# Patient Record
Sex: FEMALE | Race: ASIAN | Employment: PART TIME | URBAN - METROPOLITAN AREA
[De-identification: names, ages, dates, MRNs, and addresses within clinical notes are randomized per-mention and may not be internally consistent; named-entity substitution may affect disease eponyms.]

---

## 2017-07-08 ENCOUNTER — OFFICE VISIT (OUTPATIENT)
Dept: FAMILY MEDICINE | Facility: CLINIC | Age: 22
End: 2017-07-08
Payer: COMMERCIAL

## 2017-07-08 VITALS
WEIGHT: 122.6 LBS | RESPIRATION RATE: 12 BRPM | BODY MASS INDEX: 24.07 KG/M2 | HEART RATE: 68 BPM | DIASTOLIC BLOOD PRESSURE: 72 MMHG | HEIGHT: 60 IN | TEMPERATURE: 98 F | OXYGEN SATURATION: 100 % | SYSTOLIC BLOOD PRESSURE: 111 MMHG

## 2017-07-08 DIAGNOSIS — H10.13 ALLERGIC CONJUNCTIVITIS, BILATERAL: Primary | ICD-10-CM

## 2017-07-08 PROCEDURE — 99213 OFFICE O/P EST LOW 20 MIN: CPT | Performed by: FAMILY MEDICINE

## 2017-07-08 RX ORDER — OLOPATADINE HYDROCHLORIDE 1 MG/ML
1 SOLUTION/ DROPS OPHTHALMIC 2 TIMES DAILY
Qty: 5 ML | Refills: 3 | Status: ON HOLD | OUTPATIENT
Start: 2017-07-08 | End: 2019-10-13

## 2017-07-08 NOTE — PROGRESS NOTES
SUBJECTIVE:                                                    Maryjane Wang is a 21 year old female who presents to clinic today for the following health issues:    Concern - eye problem   Onset: a week     Description:   itching     Intensity: moderate    Progression of Symptoms:  Improving a little when used eye drops     Accompanying Signs & Symptoms:  None     Previous history of similar problem:   None     Precipitating factors:   Worsened by: none     Alleviating factors:  Improved by: eye drop helps a little     Therapies Tried and outcome:     Problem list and histories reviewed & adjusted, as indicated.  Additional history: as documented    Reviewed and updated as needed this visit by clinical staff  Tobacco  Allergies  Med Hx  Surg Hx  Fam Hx  Soc Hx      Reviewed and updated as needed this visit by Provider         ROS:  EYES: NEGATIVE for vision changes    OBJECTIVE:                                                    /72 (BP Location: Right arm, Patient Position: Chair, Cuff Size: Adult Regular)  Pulse 68  Temp 98  F (36.7  C) (Oral)  Resp 12  Ht 5' (1.524 m)  Wt 122 lb 9.6 oz (55.6 kg)  SpO2 100%  BMI 23.94 kg/m2  Body mass index is 23.94 kg/(m^2).  GENERAL APPEARANCE: healthy, alert and no distress  EYES: mild chemosis medial left eye, minimal conjunctival injection  HENT: ear canals and TM's normal, oral mucous membranes moist and oropharynx clear     ASSESSMENT/PLAN:                                                    1. Allergic conjunctivitis, bilateral  Discussed zyrtec, flonase, antihistamine eye drops.  Follow-up if not improving.  - olopatadine (PATANOL) 0.1 % ophthalmic solution; Place 1 drop into both eyes 2 times daily  Dispense: 5 mL; Refill: 3    Kaden Contreras MD  [facility]

## 2017-07-08 NOTE — MR AVS SNAPSHOT
After Visit Summary   7/8/2017    Maryjane Wang    MRN: [id number]           Patient Information     Date Of Birth          1995        Visit Information        Provider Department      7/8/2017 9:00 AM Kaden Contreras MD [facility]        Today's Diagnoses     Allergic conjunctivitis, bilateral    -  1      Care Instructions    Flonase - daily nasal spray    Zyrtec orally          Follow-ups after your visit        Who to contact     If you have questions or need follow up information about today's clinic visit or your schedule please contact [facility] directly at [phone].  Normal or non-critical lab and imaging results will be communicated to you by MyChart, letter or phone within 4 business days after the clinic has received the results. If you do not hear from us within 7 days, please contact the clinic through Plaidhart or phone. If you have a critical or abnormal lab result, we will notify you by phone as soon as possible.  Submit refill requests through [employer] or call your pharmacy and they will forward the refill request to us. Please allow 3 business days for your refill to be completed.          Additional Information About Your Visit        MyChart Information     [employer] gives you secure access to your electronic health record. If you see a primary care provider, you can also send messages to your care team and make appointments. If you have questions, please call your primary care clinic.  If you do not have a primary care provider, please call [phone] and they will assist you.        Care EveryWhere ID     This is your Care EveryWhere ID. This could be used by other organizations to access your [place] medical records  [id number]        Your Vitals Were     Pulse Temperature Respirations Height Pulse Oximetry BMI (Body Mass Index)    68 98  F (36.7  C) (Oral) 12 5' (1.524 m) 100% 23.94 kg/m2       Blood Pressure from Last 3  Encounters:   07/08/17 111/72   09/26/16 92/60   09/25/15 96/64    Weight from Last 3 Encounters:   07/08/17 122 lb 9.6 oz (55.6 kg)   09/26/16 121 lb 12.8 oz (55.2 kg)   09/25/15 122 lb 7 oz (55.5 kg)              Today, you had the following     No orders found for display         Today's Medication Changes          These changes are accurate as of: 7/8/17  9:26 AM.  If you have any questions, ask your nurse or doctor.               Start taking these medicines.        Dose/Directions    olopatadine 0.1 % ophthalmic solution   Commonly known as:  PATANOL   Used for:  Allergic conjunctivitis, bilateral   Started by:  Kaden Contreras MD        Dose:  1 drop   Place 1 drop into both eyes 2 times daily   Quantity:  5 mL   Refills:  3            Where to get your medicines      These medications were sent to [facility] [address]  [address] [facility] [address]     Phone:  [phone]     olopatadine 0.1 % ophthalmic solution                Primary Care Provider Office Phone # Fax #    Elizabeth Gianluca Layton [phone]       [facility] [address] [facility] [address]        Equal Access to Services     CARLOS MADRIGAL AH: Hadii paul ku hadasho Soomaali, waaxda luqadaha, qaybta kaalmada adeegyada, waxay idiin haysonidon cindy womack. So [facility] [phone].    ATENCIÓN: Si habla español, tiene a ashby disposición servicios gratuitos de asistencia lingüística. Llame al [phone].    We comply with applicable federal civil rights laws and [place] laws. We do not discriminate on the basis of race, color, national origin, age, disability sex, sexual orientation or gender identity.            Thank you!     Thank you for choosing [facility]  for your care. Our goal is always to provide you with excellent care. Hearing back from our patients is one way we can continue to improve our services. Please take a few  minutes to complete the written survey that you may receive in the mail after your visit with us. Thank you!             Your Updated Medication List - Protect others around you: Learn how to safely use, store and throw away your medicines at www.disposemymeds.org.          This list is accurate as of: 7/8/17  9:26 AM.  Always use your most recent med list.                   Brand Name Dispense Instructions for use Diagnosis    doxycycline 100 MG capsule    VIBRAMYCIN    14 capsule    Take 1 capsule (100 mg) by mouth 2 times daily    Chlamydia       ibuprofen 200 MG capsule     120 capsule    Take 200 mg by mouth every 4 hours as needed for fever        olopatadine 0.1 % ophthalmic solution    PATANOL    5 mL    Place 1 drop into both eyes 2 times daily    Allergic conjunctivitis, bilateral

## 2017-07-08 NOTE — NURSING NOTE
Chief Complaint   Patient presents with     Eye Problem     itching       Initial /72 (BP Location: Right arm, Patient Position: Chair, Cuff Size: Adult Regular)  Pulse 68  Temp 98  F (36.7  C) (Oral)  Resp 12  Ht 5' (1.524 m)  Wt 122 lb 9.6 oz (55.6 kg)  SpO2 100%  BMI 23.94 kg/m2 Estimated body mass index is 23.94 kg/(m^2) as calculated from the following:    Height as of this encounter: 5' (1.524 m).    Weight as of this encounter: 122 lb 9.6 oz (55.6 kg).  Medication Reconciliation: complete

## 2017-11-26 ENCOUNTER — HEALTH MAINTENANCE LETTER (OUTPATIENT)
Age: 22
End: 2017-11-26

## 2018-05-30 ENCOUNTER — OFFICE VISIT (OUTPATIENT)
Dept: INTERNAL MEDICINE | Facility: CLINIC | Age: 23
End: 2018-05-30
Payer: COMMERCIAL

## 2018-05-30 VITALS
BODY MASS INDEX: 25.8 KG/M2 | RESPIRATION RATE: 20 BRPM | DIASTOLIC BLOOD PRESSURE: 74 MMHG | OXYGEN SATURATION: 100 % | TEMPERATURE: 98 F | SYSTOLIC BLOOD PRESSURE: 108 MMHG | HEART RATE: 76 BPM | WEIGHT: 131.4 LBS | HEIGHT: 60 IN

## 2018-05-30 DIAGNOSIS — Z00.00 ROUTINE GENERAL MEDICAL EXAMINATION AT A HEALTH CARE FACILITY: Primary | ICD-10-CM

## 2018-05-30 DIAGNOSIS — A74.9 CHLAMYDIA INFECTION: ICD-10-CM

## 2018-05-30 DIAGNOSIS — Z13.29 SCREENING FOR THYROID DISORDER: ICD-10-CM

## 2018-05-30 DIAGNOSIS — R04.0 EPISTAXIS: ICD-10-CM

## 2018-05-30 DIAGNOSIS — Z23 ENCOUNTER FOR IMMUNIZATION: ICD-10-CM

## 2018-05-30 DIAGNOSIS — Z13.6 CARDIOVASCULAR SCREENING; LDL GOAL LESS THAN 160: ICD-10-CM

## 2018-05-30 PROCEDURE — 99395 PREV VISIT EST AGE 18-39: CPT | Mod: 25 | Performed by: INTERNAL MEDICINE

## 2018-05-30 PROCEDURE — 90651 9VHPV VACCINE 2/3 DOSE IM: CPT | Performed by: INTERNAL MEDICINE

## 2018-05-30 PROCEDURE — 90471 IMMUNIZATION ADMIN: CPT | Performed by: INTERNAL MEDICINE

## 2018-05-30 PROCEDURE — 90472 IMMUNIZATION ADMIN EACH ADD: CPT | Performed by: INTERNAL MEDICINE

## 2018-05-30 PROCEDURE — 90746 HEPB VACCINE 3 DOSE ADULT IM: CPT | Performed by: INTERNAL MEDICINE

## 2018-05-30 NOTE — NURSING NOTE
Screening Questionnaire for Adult Immunization    Are you sick today?   No   Do you have allergies to medications, food, a vaccine component or latex?   No   Have you ever had a serious reaction after receiving a vaccination?   No   Do you have a long-term health problem with heart disease, lung disease, asthma, kidney disease, metabolic disease (e.g. diabetes), anemia, or other blood disorder?   No   Do you have cancer, leukemia, HIV/AIDS, or any other immune system problem?   No   In the past 3 months, have you taken medications that affect  your immune system, such as prednisone, other steroids, or anticancer drugs; drugs for the treatment of rheumatoid arthritis, Crohn s disease, or psoriasis; or have you had radiation treatments?   No   Have you had a seizure, or a brain or other nervous system problem?   No   During the past year, have you received a transfusion of blood or blood     products, or been given immune (gamma) globulin or antiviral drug?   No   For women: Are you pregnant or is there a chance you could become        pregnant during the next month?   No   Have you received any vaccinations in the past 4 weeks?   No     Immunization questionnaire answers were all negative.        Per orders of Dr. Crowley, injection of Hep B and HPV given by Diann Shane. Patient instructed to remain in clinic for 15 minutes afterwards, and to report any adverse reaction to me immediately.       Screening performed by Diann Shane on 5/30/2018 at 2:51 PM.    Prior to injection verified patient identity using patient's name and date of birth.  Due to injection administration, patient instructed to remain in clinic for 15 minutes  afterwards, and to report any adverse reaction to me immediately.

## 2018-05-30 NOTE — MR AVS SNAPSHOT
After Visit Summary   5/30/2018    Maryjane Wang    MRN: [id number]           Patient Information     Date Of Birth          1995        Visit Information        Provider Department      5/30/2018 1:45 PM Radha Crowley MD [facility]        Today's Diagnoses     Routine general medical examination at a health care facility    -  1    Epistaxis        CARDIOVASCULAR SCREENING; LDL GOAL LESS THAN 160        Screening for thyroid disorder        Chlamydia infection        Encounter for immunization           Follow-ups after your visit        Additional Services     OTOLARYNGOLOGY REFERRAL       Your provider has referred you to: N: [facility] [phone]   [email or web address]    Please be aware that coverage of these services is subject to the terms and limitations of your health insurance plan.  Call member services at your health plan with any benefit or coverage questions.      Please bring the following with you to your appointment:    (1) Any X-Rays, CTs or MRIs which have been performed.  Contact the facility where they were done to arrange for  prior to your scheduled appointment.   (2) List of current medications  (3) This referral request   (4) Any documents/labs given to you for this referral                  Your next 10 appointments already scheduled     Jun 01, 2018 10:00 AM CDT   LAB with [facility]   [facility]    [address] [place]  [facility] [id number] [phone]           Please do not eat 10-12 hours before your appointment if you are coming in fasting for labs on lipids, cholesterol, or glucose (sugar). This does not apply to pregnant women. Water, hot tea and black coffee (with nothing added) are okay. Do not drink other fluids, diet soda or chew gum.              Future tests that were ordered for you today      Open Future Orders        Priority Expected Expires Ordered    CBC with platelets differential Routine 6/13/2018 8/14/2018 5/30/2018    Chlamydia trachomatis PCR Routine 6/13/2018 8/14/2018 5/30/2018    Neisseria gonorrhoeae PCR Routine 6/13/2018 8/14/2018 5/30/2018    Lipid panel reflex to direct LDL Fasting Routine 6/13/2018 8/14/2018 5/30/2018    TSH with free T4 reflex Routine 6/13/2018 8/14/2018 5/30/2018    Basic metabolic panel Routine 6/13/2018 8/14/2018 5/30/2018            Who to contact     If you have questions or need follow up information about today's clinic visit or your schedule please contact [facility] directly at [phone].  Normal or non-critical lab and imaging results will be communicated to you by MyChart, letter or phone within 4 business days after the clinic has received the results. If you do not hear from us within 7 days, please contact the clinic through [employer] Living[employer] or phone. If you have a critical or abnormal lab result, we will notify you by phone as soon as possible.  Submit refill requests through [employer] or call your pharmacy and they will forward the refill request to us. Please allow 3 business days for your refill to be completed.          Additional Information About Your Visit        [employer] Information     [employer] gives you secure access to your electronic health record. If you see a primary care provider, you can also send messages to your care team and make appointments. If you have questions, please call your primary care clinic.  If you do not have a primary care provider, please call [phone] and they will assist you.        Care EveryWhere ID     This is your Care EveryWhere ID. This could be used by other organizations to access your [place] medical records  [id number]        Your Vitals Were     Pulse Temperature Respirations Height Last Period Pulse Oximetry    76 98  F (36.7  C) (Oral) 20 5' (1.524 m) 05/29/2018 (Exact Date) 100%     Breastfeeding? BMI (Body Mass Index)                No 25.66 kg/m2           Blood Pressure from Last 3 Encounters:   05/30/18 108/74   07/08/17 111/72   09/26/16 92/60    Weight from Last 3 Encounters:   05/30/18 131 lb 6.4 oz (59.6 kg)   07/08/17 122 lb 9.6 oz (55.6 kg)   09/26/16 121 lb 12.8 oz (55.2 kg)              We Performed the Following     HEPATITIS B VACCINE, ADULT, IM     HUMAN PAPILLOMA VIRUS (GARDASIL 9) VACCINE     OTOLARYNGOLOGY REFERRAL        Primary Care Provider Office Phone # Fax #    Elizabeth Layton [phone]       [address]  [facility] [address]        Equal Access to Services     CARLOS MADRIGAL : Hadii paul burnso Soeber, waaxda luqadaha, qaybta kaalmada adeegyada, pina craig . So [facility] [phone].    ATENCIÓN: Si habla español, tiene a ashby disposición servicios gratuitos de asistencia lingüística. Llame al [phone].    We comply with applicable federal civil rights laws and [place] laws. We do not discriminate on the basis of race, color, national origin, age, disability, sex, sexual orientation, or gender identity.            Thank you!     Thank you for choosing [facility]  for your care. Our goal is always to provide you with excellent care. Hearing back from our patients is one way we can continue to improve our services. Please take a few minutes to complete the written survey that you may receive in the mail after your visit with us. Thank you!             Your Updated Medication List - Protect others around you: Learn how to safely use, store and throw away your medicines at www.disposemymeds.org.          This list is accurate as of 5/30/18  3:20 PM.  Always use your most recent med list.                   Brand Name Dispense Instructions for use Diagnosis    ibuprofen 200 MG capsule     120 capsule    Take 200 mg by mouth every 4 hours as needed for fever        olopatadine 0.1 % ophthalmic  solution    PATANOL    5 mL    Place 1 drop into both eyes 2 times daily    Allergic conjunctivitis, bilateral

## 2018-05-30 NOTE — PROGRESS NOTES
SUBJECTIVE:   CC: Maryjane Wang is an 22 year old woman who presents for preventive health visit.     Physical   Annual:     Getting at least 3 servings of Calcium per day::  Yes    Bi-annual eye exam::  Yes    Dental care twice a year::  Yes    Sleep apnea or symptoms of sleep apnea::  None    Diet::  Regular (no restrictions)    Frequency of exercise::  None    Taking medications regularly::  Yes    Medication side effects::  None    Additional concerns today::  YES              Chlamydia testing done on this date: 2017 [facility]      Recurrent epistaxis     Today's PHQ-2 Score:   PHQ-2 ( 1999 Pfizer) 5/30/2018   Q1: Little interest or pleasure in doing things 0   Q2: Feeling down, depressed or hopeless 0   PHQ-2 Score 0   Q1: Little interest or pleasure in doing things Not at all   Q2: Feeling down, depressed or hopeless Not at all   PHQ-2 Score 0       Abuse: Current or Past(Physical, Sexual or Emotional)- No  Do you feel safe in your environment - Yes    Social History   Substance Use Topics     Smoking status: Never Smoker     Smokeless tobacco: Never Used      Comment: family member sometimes smokes in the car     Alcohol use 0.0 oz/week     0 Standard drinks or equivalent per week      Comment: once per month     Alcohol Use 5/30/2018   If you drink alcohol do you typically have greater than 3 drinks per day OR greater than 7 drinks per week? No   No flowsheet data found.    Reviewed orders with patient.  Reviewed health maintenance and updated orders accordingly - Yes  Patient Active Problem List   Diagnosis     CARDIOVASCULAR SCREENING; LDL GOAL LESS THAN 160     HSV-1 (herpes simplex virus 1) infection     HSV-2 (herpes simplex virus 2) infection     Pain in joint of right shoulder     Cervicalgia     Past Surgical History:   Procedure Laterality Date     NO HISTORY OF SURGERY         Social History   Substance Use Topics     Smoking status: Never Smoker     Smokeless tobacco: Never Used       Comment: family member sometimes smokes in the car     Alcohol use 0.0 oz/week     0 Standard drinks or equivalent per week      Comment: once per month     Family History   Problem Relation Age of Onset     Family History Negative Mother      Unknown/Adopted Father 78     unknown reason           Mammogram not appropriate for this patient based on age.    Pertinent mammograms are reviewed under the imaging tab.  History of abnormal Pap smear: NO - age 21-29 PAP every 3 years recommended    Reviewed and updated as needed this visit by clinical staff  Tobacco  Allergies  Meds  Med Hx  Surg Hx  Fam Hx  Soc Hx        Reviewed and updated as needed this visit by Provider            Review of Systems  CONSTITUTIONAL: NEGATIVE for fever, chills, change in weight  INTEGUMENTARU/SKIN: NEGATIVE for worrisome rashes, moles or lesions  EYES: NEGATIVE for vision changes or irritation  ENT: +bloody nose  RESP: NEGATIVE for significant cough or SOB  BREAST: NEGATIVE for masses, tenderness or discharge  CV: NEGATIVE for chest pain, palpitations or peripheral edema  GI: NEGATIVE for nausea, abdominal pain, heartburn, or change in bowel habits  : NEGATIVE for unusual urinary or vaginal symptoms. Periods are regular.  MUSCULOSKELETAL: NEGATIVE for significant arthralgias or myalgia  NEURO: NEGATIVE for weakness, dizziness or paresthesias  PSYCHIATRIC: NEGATIVE for changes in mood or affect     OBJECTIVE:   There were no vitals taken for this visit.  Physical Exam  GENERAL: healthy, alert and no distress  HENT: ear canals and TM's normal, dry nasal mucosa  NECK: no adenopathy, no asymmetry, masses, or scars and thyroid normal to palpation  RESP: lungs clear to auscultation - no rales, rhonchi or wheezes  CV: regular rate and rhythm, normal S1 S2, no S3 or S4, no murmur, click or rub, no peripheral edema and peripheral pulses strong  ABDOMEN: soft, nontender, no hepatosplenomegaly, no masses and bowel sounds normal  MS: no  gross musculoskeletal defects noted, no edema    ASSESSMENT/PLAN:   (Z00.00) Routine general medical examination at a health care facility  (primary encounter diagnosis)  Comment: see below    Plan:     (R04.0) Epistaxis  Comment: recurrent nose bleeds. Discussed likely needs humidification , will try first. If persistent can see ENT  Plan: OTOLARYNGOLOGY REFERRAL, CBC with platelets         differential            (Z13.6) CARDIOVASCULAR SCREENING; LDL GOAL LESS THAN 160  Comment: screen, previous LDL quite high  Plan: Lipid panel reflex to direct LDL Fasting            (Z13.29) Screening for thyroid disorder  Comment: screen  Plan: TSH with free T4 reflex, Basic metabolic panel            (A74.9) Chlamydia infection  Comment: screen, previous chlamydia positive  Plan: Chlamydia trachomatis PCR, Neisseria         gonorrhoeae PCR            (Z23) Encounter for immunization  Comment: need for vaccine  Plan: HEPATITIS B VACCINE, ADULT, IM, HUMAN PAPILLOMA        VIRUS (GARDASIL 9) VACCINE              COUNSELING:  Reviewed preventive health counseling, as reflected in patient instructions       Regular exercise       Healthy diet/nutrition         reports that she has never smoked. She has never used smokeless tobacco.    Estimated body mass index is 23.94 kg/(m^2) as calculated from the following:    Height as of 7/8/17: 5' (1.524 m).    Weight as of 7/8/17: 122 lb 9.6 oz (55.6 kg).       Counseling Resources:  ATP IV Guidelines  Pooled Cohorts Equation Calculator  Breast Cancer Risk Calculator  FRAX Risk Assessment  ICSI Preventive Guidelines  Dietary Guidelines for Americans, 2010  USDA's MyPlate  ASA Prophylaxis  Lung CA Screening    Radha Crowley MD  [facility]  Answers for HPI/ROS submitted by the patient on 5/30/2018   PHQ-2 Score: 0

## 2018-06-01 DIAGNOSIS — R04.0 EPISTAXIS: ICD-10-CM

## 2018-06-01 DIAGNOSIS — A74.9 CHLAMYDIA INFECTION: ICD-10-CM

## 2018-06-01 DIAGNOSIS — Z13.29 SCREENING FOR THYROID DISORDER: ICD-10-CM

## 2018-06-01 DIAGNOSIS — Z13.6 CARDIOVASCULAR SCREENING; LDL GOAL LESS THAN 160: ICD-10-CM

## 2018-06-01 LAB
BASOPHILS # BLD AUTO: 0.1 10E9/L (ref 0–0.2)
BASOPHILS NFR BLD AUTO: 1.8 %
DIFFERENTIAL METHOD BLD: NORMAL
EOSINOPHIL # BLD AUTO: 0.4 10E9/L (ref 0–0.7)
EOSINOPHIL NFR BLD AUTO: 7.1 %
ERYTHROCYTE [DISTWIDTH] IN BLOOD BY AUTOMATED COUNT: 12.1 % (ref 10–15)
HCT VFR BLD AUTO: 39.7 % (ref 35–47)
HGB BLD-MCNC: 13.1 G/DL (ref 11.7–15.7)
LYMPHOCYTES # BLD AUTO: 1.8 10E9/L (ref 0.8–5.3)
LYMPHOCYTES NFR BLD AUTO: 37.3 %
MCH RBC QN AUTO: 29.8 PG (ref 26.5–33)
MCHC RBC AUTO-ENTMCNC: 33 G/DL (ref 31.5–36.5)
MCV RBC AUTO: 90 FL (ref 78–100)
MONOCYTES # BLD AUTO: 0.4 10E9/L (ref 0–1.3)
MONOCYTES NFR BLD AUTO: 8.5 %
NEUTROPHILS # BLD AUTO: 2.2 10E9/L (ref 1.6–8.3)
NEUTROPHILS NFR BLD AUTO: 45.3 %
PLATELET # BLD AUTO: 300 10E9/L (ref 150–450)
RBC # BLD AUTO: 4.39 10E12/L (ref 3.8–5.2)
WBC # BLD AUTO: 4.9 10E9/L (ref 4–11)

## 2018-06-01 PROCEDURE — 85025 COMPLETE CBC W/AUTO DIFF WBC: CPT | Performed by: INTERNAL MEDICINE

## 2018-06-01 PROCEDURE — 80048 BASIC METABOLIC PNL TOTAL CA: CPT | Performed by: INTERNAL MEDICINE

## 2018-06-01 PROCEDURE — 36415 COLL VENOUS BLD VENIPUNCTURE: CPT | Performed by: INTERNAL MEDICINE

## 2018-06-01 PROCEDURE — 87491 CHLMYD TRACH DNA AMP PROBE: CPT | Performed by: INTERNAL MEDICINE

## 2018-06-01 PROCEDURE — 84443 ASSAY THYROID STIM HORMONE: CPT | Performed by: INTERNAL MEDICINE

## 2018-06-01 PROCEDURE — 80061 LIPID PANEL: CPT | Performed by: INTERNAL MEDICINE

## 2018-06-01 PROCEDURE — 87591 N.GONORRHOEAE DNA AMP PROB: CPT | Performed by: INTERNAL MEDICINE

## 2018-06-02 LAB
ANION GAP SERPL CALCULATED.3IONS-SCNC: 6 MMOL/L (ref 3–14)
BUN SERPL-MCNC: 13 MG/DL (ref 7–30)
CALCIUM SERPL-MCNC: 9 MG/DL (ref 8.5–10.1)
CHLORIDE SERPL-SCNC: 109 MMOL/L (ref 94–109)
CHOLEST SERPL-MCNC: 189 MG/DL
CO2 SERPL-SCNC: 26 MMOL/L (ref 20–32)
CREAT SERPL-MCNC: 0.84 MG/DL (ref 0.52–1.04)
GFR SERPL CREATININE-BSD FRML MDRD: 84 ML/MIN/1.7M2
GLUCOSE SERPL-MCNC: 91 MG/DL (ref 70–99)
HDLC SERPL-MCNC: 45 MG/DL
LDLC SERPL CALC-MCNC: 134 MG/DL
NONHDLC SERPL-MCNC: 144 MG/DL
POTASSIUM SERPL-SCNC: 4.1 MMOL/L (ref 3.4–5.3)
SODIUM SERPL-SCNC: 141 MMOL/L (ref 133–144)
TRIGL SERPL-MCNC: 48 MG/DL
TSH SERPL DL<=0.005 MIU/L-ACNC: 0.72 MU/L (ref 0.4–4)

## 2018-06-03 LAB
C TRACH DNA SPEC QL NAA+PROBE: NEGATIVE
N GONORRHOEA DNA SPEC QL NAA+PROBE: NEGATIVE
SPECIMEN SOURCE: NORMAL
SPECIMEN SOURCE: NORMAL

## 2018-06-04 ENCOUNTER — TELEPHONE (OUTPATIENT)
Dept: INTERNAL MEDICINE | Facility: CLINIC | Age: 23
End: 2018-06-04

## 2018-10-17 ENCOUNTER — TELEPHONE (OUTPATIENT)
Dept: INTERNAL MEDICINE | Facility: CLINIC | Age: 23
End: 2018-10-17

## 2018-10-17 NOTE — TELEPHONE ENCOUNTER
Employer did not receive Biometric form that was faxed to them in June 2018. Pt brought in a new form. Please sign and give to DOYLE Lucio to fax.   DOYLE Glynn

## 2019-02-20 LAB
ABO + RH BLD: NORMAL
ABO + RH BLD: NORMAL
BLD GP AB SCN SERPL QL: NORMAL
C TRACH DNA SPEC QL PROBE+SIG AMP: NORMAL
HBV SURFACE AG SERPL QL IA: NORMAL
HIV 1+2 AB+HIV1 P24 AG SERPL QL IA: NORMAL
N GONORRHOEA DNA SPEC QL PROBE+SIG AMP: NORMAL
RUBELLA ABY IGG: NORMAL

## 2019-04-18 ENCOUNTER — MEDICAL CORRESPONDENCE (OUTPATIENT)
Dept: HEALTH INFORMATION MANAGEMENT | Facility: CLINIC | Age: 24
End: 2019-04-18

## 2019-04-18 ENCOUNTER — TRANSFERRED RECORDS (OUTPATIENT)
Dept: HEALTH INFORMATION MANAGEMENT | Facility: CLINIC | Age: 24
End: 2019-04-18

## 2019-04-19 ENCOUNTER — TRANSCRIBE ORDERS (OUTPATIENT)
Dept: MATERNAL FETAL MEDICINE | Facility: CLINIC | Age: 24
End: 2019-04-19

## 2019-04-19 DIAGNOSIS — O26.90 PREGNANCY RELATED CONDITION, ANTEPARTUM: Primary | ICD-10-CM

## 2019-05-10 NOTE — PROGRESS NOTES
Maternal-Fetal Medicine Consultation    Maryjane Wang  : 1995  MRN: [id number]    REFERRAL:  Maryjane Wang is a 23 year old sent by Dr. Martinez from [facility] for MFM consultation.    HPI:  Maryjane Wang is a 23 year old  at 19w4d by 7w0d US here for MFM consultation regarding possible Zika exposure at time of conception.      She is here with her .    Patient reports travel to the [facility] from 18 to 1/10/19 around the time she conceived this pregnancy.  She experienced no symptoms of fever, rash, headache, joint pain, red eyes, or muscle pain.  Arbovirus RNA was negative for Zika, dengue or chikungunya on 19.  Zika NIKKI was equivocal on 3/5/19, and Zika IgM was presumptive positive on 3/28/19.       She has returned to [facility] from  to  for a family .  Denies any symptoms of illness during or after that travel and has not undergone further testing. They do report unprotected intercourse once during the pregnancy.    Pregnancy complicated by:  - suspected Zika exposure  - history of chlamydia and gonorrhea  - history of genital HSV    Prenatal Care:  Primary OB care this pregnancy has been with Dr. Shira Martinez  from [facility].    Dating:    LMP: 2018, irregular  Dating ultrasound: performed per chart, unable to see result,   Assigned EDC: 10/7/19 by 7w0d US    Obstetrics History:  G1 - current pregnancy    Gynecologic History:  - Menstrual history: irregular menses, LMP: 19 not consistent with ultrasound  - Last Pap: NIL HPV: neg (2016)  - Denies any history of abnormal pap smears   - Denies prior cervical surgery or procedures     Past Medical History:  Genital HSV  Chlamydia  Gonorrhea  Zika exposure    Past Surgical History:  HSG    Current Medications:  PNV  Tylenol PRN  Tums PRN    Allergies:  Patient has no known allergies.    Social History:   Occupation: Works at [employer]  Denies use of alcohol, drugs or smoking.     Family  History:  Denies any faily history of blood clots or cardiac anomalies.    Partner History:  Travel to [facility] with partner both in 2018 and 2019, denies any Zika symptoms     ROS:  10-point ROS negative except as in HPI     PHYSICAL EXAM:   Deferred     LMP 2018     Prenatal Labs:    ABO/Rh: O pos  ABS: neg   Hgb: 12.9  MCV: 88  Plt: 278    TPA: NR  HepBSAg: NR  HIV: NR  Rubella: immune     Urine Cx: negative        Genetic Testing:   Comprehensive ultrasound today normal with suboptimal heart review, repeat in 4 weeks      ASSESSMENT:  Maryjane Wang is a 23 year old  at 19w4d by 7w0d US here for [facility] consultation regarding:     - Zika exposure  Zika virus is transmitted through mosquito bites in affected regions of the world, from a pregnant woman to her fetus, and through sexual intercourse.  Many people infected with Zika virus won't have symptoms or will only have mild symptoms.  The most common symptoms are fever, rash, headache, join pain, red eyes or muscle pain.  Symptoms can last for several days to a week.  People usually don't get sick enough to require hospitalization and death is very rare.  Zika infection during pregnancy, however can cause microcephaly and other severe brain defects.  The overall rate of birth defects is 5-10% in women with confirmed Zika virus infection during pregnancy with a high proprtion of babies whose mothers were infected during the first trimester (approximately 8-15%).  It has also been linked to miscarriage, stillbirth and other brain abnormalities.  Pregnant women who travel to or reside in any areas with risk for Zika virus transmission before and during the current pregnancy as well as with possible sexual exposure before and during the current pregnancy or with symptoms of Zika virus during the current pregnancy including fever, rash, conjunctivitis or arthralgias should undergo testing three times during pregnancy.  According to the CDC  and the situation for Ms. Wang, if asymptomatic with recent possible Zika exposure and without ongoing exposure testing is not routinely recommended.  Her Zika RNA was negative however the IgM was presumptive positive and the confirmatory test, PRNT, was also positive signifying the patient may have been exposed to Zika.  Without symptoms it is difficult to know exact timing of when she may have been exposed to Zika virus and it may have been remote from her pregnancy.  It is also possible to get a positive test result with history of dengue infection for which she also showed some immunity.  While she has no further plans to travel to a Zika area in this pregnancy she reports unprotected intercourse and therefore has another possible Zika exposure via her sexual partner.    RECOMMENDATIONS:    #Possible Zika exposure  - With a negative serum Zika RNA and a positive IgM this signifies likely flavivirus infection however the specific virus cannot be identified and the timing of infection cannot be determined.  I spoke with the [facility] Zika  who said that repeat Zika PCR would be reasonable but would not .   - Overall reassuring that ultrasound today is normal but will need to perform growth ultrasound again in 4 weeks and will continue to monitor head circumference. After that, repeat growth ultrasound Q6 weeks with emphasis on growth of head and any abnormal movements.  If ultrasound abnormalities seen, should contact MD.    - Postnatally, baby will need head ultrasound, more advanced hearing and eye exams after birth, more details regarding this on CDC website  - Recommend avoiding travel to areas with Zika transmission for the remainder of your pregnancy  -Recommend avoiding unprotected sexual intercourse or abstaining from sexual intercourse for the remainder of the pregnancy and at least 3 months from last travel      The patient was seen and evaluated with Dr. Arnold    Thank you for  "allowing us to participate in the care of your patient. Please do not hesitate to contact us if you have further questions regarding the management of your patient.     I acted as a scribe for Dr. Rauk Amy Schumer, MD  Obstetrics and Gynecology, PGY-2    Total time spent in face-to-face counseling was 15 minutes (>50% for medical management discussion and plan of care). A copy of this consult was sent to your office.     Please see \"Imaging\" tab under \"Chart Review\" for details of today's US at the [facility].    This note, as scribed, accurately reflects the examination, my impressions and plan as discussed with the patient.    Chepe Arnold MD  Maternal-Fetal Medicine                "

## 2019-05-15 ENCOUNTER — PRE VISIT (OUTPATIENT)
Dept: MATERNAL FETAL MEDICINE | Facility: CLINIC | Age: 24
End: 2019-05-15

## 2019-05-16 ENCOUNTER — HOSPITAL ENCOUNTER (OUTPATIENT)
Dept: ULTRASOUND IMAGING | Facility: CLINIC | Age: 24
Discharge: HOME | End: 2019-05-16
Attending: OBSTETRICS & GYNECOLOGY | Admitting: OBSTETRICS & GYNECOLOGY
Payer: COMMERCIAL

## 2019-05-16 ENCOUNTER — OFFICE VISIT (OUTPATIENT)
Dept: MATERNAL FETAL MEDICINE | Facility: CLINIC | Age: 24
End: 2019-05-16
Attending: OBSTETRICS & GYNECOLOGY
Payer: COMMERCIAL

## 2019-05-16 DIAGNOSIS — O26.90 PREGNANCY RELATED CONDITION, ANTEPARTUM: ICD-10-CM

## 2019-05-16 DIAGNOSIS — Z20.821 ZIKA VIRUS EXPOSURE AFFECTING PREGNANCY: Primary | ICD-10-CM

## 2019-05-16 DIAGNOSIS — O99.891 ZIKA VIRUS EXPOSURE AFFECTING PREGNANCY: Primary | ICD-10-CM

## 2019-05-16 PROCEDURE — 76811 OB US DETAILED SNGL FETUS: CPT

## 2019-05-16 NOTE — NURSING NOTE
Maryjane here with FOB for comp U/S and MFM consult due to Zika exposure in pregnancy. Patient reports no symptoms of Zika.  See Dr. Arnold's consult note. Patient scheduled for f/u in 4 weeks to evaluate heart and to evaluate for Zika on U/S. Jasmina Silva RN

## 2019-06-14 ENCOUNTER — HOSPITAL ENCOUNTER (OUTPATIENT)
Dept: ULTRASOUND IMAGING | Facility: CLINIC | Age: 24
Discharge: HOME | End: 2019-06-14
Attending: OBSTETRICS & GYNECOLOGY | Admitting: OBSTETRICS & GYNECOLOGY
Payer: COMMERCIAL

## 2019-06-14 ENCOUNTER — OFFICE VISIT (OUTPATIENT)
Dept: MATERNAL FETAL MEDICINE | Facility: CLINIC | Age: 24
End: 2019-06-14
Attending: OBSTETRICS & GYNECOLOGY
Payer: COMMERCIAL

## 2019-06-14 DIAGNOSIS — O99.891 ZIKA VIRUS EXPOSURE AFFECTING PREGNANCY: Primary | ICD-10-CM

## 2019-06-14 DIAGNOSIS — O99.891 ZIKA VIRUS EXPOSURE AFFECTING PREGNANCY: ICD-10-CM

## 2019-06-14 DIAGNOSIS — Z20.821 ZIKA VIRUS EXPOSURE AFFECTING PREGNANCY: ICD-10-CM

## 2019-06-14 DIAGNOSIS — Z20.821 ZIKA VIRUS EXPOSURE AFFECTING PREGNANCY: Primary | ICD-10-CM

## 2019-06-14 PROCEDURE — 76816 OB US FOLLOW-UP PER FETUS: CPT

## 2019-06-14 NOTE — PROGRESS NOTES
Please see ultrasound report under imaging tab for details on ultrasound performed today.    Jasmina Quiñones MD  , OB/GYN  Maternal-Fetal Medicine  [email or web address]  [phone] (Academic office)  [phone] (Pager)

## 2019-07-10 ENCOUNTER — HOSPITAL ENCOUNTER (OUTPATIENT)
Dept: ULTRASOUND IMAGING | Facility: CLINIC | Age: 24
Discharge: HOME | End: 2019-07-10
Attending: OBSTETRICS & GYNECOLOGY | Admitting: OBSTETRICS & GYNECOLOGY
Payer: COMMERCIAL

## 2019-07-10 ENCOUNTER — OFFICE VISIT (OUTPATIENT)
Dept: MATERNAL FETAL MEDICINE | Facility: CLINIC | Age: 24
End: 2019-07-10
Attending: OBSTETRICS & GYNECOLOGY
Payer: COMMERCIAL

## 2019-07-10 DIAGNOSIS — O99.891 ZIKA VIRUS EXPOSURE AFFECTING PREGNANCY: ICD-10-CM

## 2019-07-10 DIAGNOSIS — Z20.821 ZIKA VIRUS EXPOSURE AFFECTING PREGNANCY: ICD-10-CM

## 2019-07-10 DIAGNOSIS — O99.891 ZIKA VIRUS EXPOSURE AFFECTING PREGNANCY: Primary | ICD-10-CM

## 2019-07-10 DIAGNOSIS — Z20.821 ZIKA VIRUS EXPOSURE AFFECTING PREGNANCY: Primary | ICD-10-CM

## 2019-07-10 PROCEDURE — 76816 OB US FOLLOW-UP PER FETUS: CPT

## 2019-07-10 NOTE — PROGRESS NOTES
Please see full imaging report from [employer] program under imaging tab.      Mavis Hatfield MD  Maternal Fetal Medicine

## 2019-08-01 LAB — TREPONEMA ANTIBODIES: NORMAL

## 2019-08-07 ENCOUNTER — OFFICE VISIT (OUTPATIENT)
Dept: MATERNAL FETAL MEDICINE | Facility: CLINIC | Age: 24
End: 2019-08-07
Attending: OBSTETRICS & GYNECOLOGY
Payer: COMMERCIAL

## 2019-08-07 ENCOUNTER — HOSPITAL ENCOUNTER (OUTPATIENT)
Dept: ULTRASOUND IMAGING | Facility: CLINIC | Age: 24
Discharge: HOME | End: 2019-08-07
Attending: OBSTETRICS & GYNECOLOGY | Admitting: OBSTETRICS & GYNECOLOGY
Payer: COMMERCIAL

## 2019-08-07 DIAGNOSIS — O99.891 ZIKA VIRUS EXPOSURE AFFECTING PREGNANCY: Primary | ICD-10-CM

## 2019-08-07 DIAGNOSIS — O99.891 ZIKA VIRUS EXPOSURE AFFECTING PREGNANCY: ICD-10-CM

## 2019-08-07 DIAGNOSIS — Z20.821 ZIKA VIRUS EXPOSURE AFFECTING PREGNANCY: ICD-10-CM

## 2019-08-07 DIAGNOSIS — Z20.821 ZIKA VIRUS EXPOSURE AFFECTING PREGNANCY: Primary | ICD-10-CM

## 2019-08-07 PROCEDURE — 76816 OB US FOLLOW-UP PER FETUS: CPT

## 2019-09-13 LAB — GROUP B STREP PCR: NEGATIVE

## 2019-10-13 ENCOUNTER — HOSPITAL ENCOUNTER (INPATIENT)
Facility: CLINIC | Age: 24
LOS: 3 days | Discharge: HOME | End: 2019-10-16
Attending: OBSTETRICS & GYNECOLOGY | Admitting: OBSTETRICS & GYNECOLOGY
Payer: COMMERCIAL

## 2019-10-13 ENCOUNTER — ANESTHESIA (OUTPATIENT)
Dept: OBGYN | Facility: CLINIC | Age: 24
End: 2019-10-13
Payer: COMMERCIAL

## 2019-10-13 ENCOUNTER — ANESTHESIA EVENT (OUTPATIENT)
Dept: OBGYN | Facility: CLINIC | Age: 24
End: 2019-10-13
Payer: COMMERCIAL

## 2019-10-13 LAB
ABO + RH BLD: NORMAL
ABO + RH BLD: NORMAL
BASOPHILS # BLD AUTO: 0.1 10E9/L (ref 0–0.2)
BASOPHILS NFR BLD AUTO: 0.6 %
BLD GP AB SCN SERPL QL: NORMAL
BLOOD BANK CMNT PATIENT-IMP: NORMAL
DIFFERENTIAL METHOD BLD: NORMAL
EOSINOPHIL # BLD AUTO: 0.1 10E9/L (ref 0–0.7)
EOSINOPHIL NFR BLD AUTO: 1.3 %
ERYTHROCYTE [DISTWIDTH] IN BLOOD BY AUTOMATED COUNT: 13.9 % (ref 10–15)
HCT VFR BLD AUTO: 40.9 % (ref 35–47)
HGB BLD-MCNC: 13.7 G/DL (ref 11.7–15.7)
IMM GRANULOCYTES # BLD: 0.1 10E9/L (ref 0–0.4)
IMM GRANULOCYTES NFR BLD: 0.6 %
LYMPHOCYTES # BLD AUTO: 2 10E9/L (ref 0.8–5.3)
LYMPHOCYTES NFR BLD AUTO: 22.4 %
MCH RBC QN AUTO: 30.8 PG (ref 26.5–33)
MCHC RBC AUTO-ENTMCNC: 33.5 G/DL (ref 31.5–36.5)
MCV RBC AUTO: 92 FL (ref 78–100)
MONOCYTES # BLD AUTO: 0.7 10E9/L (ref 0–1.3)
MONOCYTES NFR BLD AUTO: 7.7 %
NEUTROPHILS # BLD AUTO: 6.1 10E9/L (ref 1.6–8.3)
NEUTROPHILS NFR BLD AUTO: 67.4 %
NRBC # BLD AUTO: 0 10*3/UL
NRBC BLD AUTO-RTO: 0 /100
PLATELET # BLD AUTO: 227 10E9/L (ref 150–450)
RBC # BLD AUTO: 4.45 10E12/L (ref 3.8–5.2)
SPECIMEN EXP DATE BLD: NORMAL
WBC # BLD AUTO: 9 10E9/L (ref 4–11)

## 2019-10-13 PROCEDURE — 25800030 ZZH RX IP 258 OP 636: Performed by: OBSTETRICS & GYNECOLOGY

## 2019-10-13 PROCEDURE — 86780 TREPONEMA PALLIDUM: CPT | Performed by: OBSTETRICS & GYNECOLOGY

## 2019-10-13 PROCEDURE — 85025 COMPLETE CBC W/AUTO DIFF WBC: CPT | Performed by: OBSTETRICS & GYNECOLOGY

## 2019-10-13 PROCEDURE — 25000132 ZZH RX MED GY IP 250 OP 250 PS 637: Performed by: OBSTETRICS & GYNECOLOGY

## 2019-10-13 PROCEDURE — 25000128 H RX IP 250 OP 636: Performed by: ANESTHESIOLOGY

## 2019-10-13 PROCEDURE — 36415 COLL VENOUS BLD VENIPUNCTURE: CPT | Performed by: OBSTETRICS & GYNECOLOGY

## 2019-10-13 PROCEDURE — 86900 BLOOD TYPING SEROLOGIC ABO: CPT | Performed by: OBSTETRICS & GYNECOLOGY

## 2019-10-13 PROCEDURE — 86850 RBC ANTIBODY SCREEN: CPT | Performed by: OBSTETRICS & GYNECOLOGY

## 2019-10-13 PROCEDURE — 25000128 H RX IP 250 OP 636: Performed by: OBSTETRICS & GYNECOLOGY

## 2019-10-13 PROCEDURE — 12000000 ZZH R&B MED SURG/OB

## 2019-10-13 PROCEDURE — 3E0R3BZ INTRODUCTION OF ANESTHETIC AGENT INTO SPINAL CANAL, PERCUTANEOUS APPROACH: ICD-10-PCS | Performed by: ANESTHESIOLOGY

## 2019-10-13 PROCEDURE — 37000011 ZZH ANESTHESIA WARD SERVICE

## 2019-10-13 PROCEDURE — 86901 BLOOD TYPING SEROLOGIC RH(D): CPT | Performed by: OBSTETRICS & GYNECOLOGY

## 2019-10-13 PROCEDURE — 25000125 ZZHC RX 250: Performed by: OBSTETRICS & GYNECOLOGY

## 2019-10-13 PROCEDURE — 00HU33Z INSERTION OF INFUSION DEVICE INTO SPINAL CANAL, PERCUTANEOUS APPROACH: ICD-10-PCS | Performed by: ANESTHESIOLOGY

## 2019-10-13 RX ORDER — ONDANSETRON 4 MG/1
4 TABLET, ORALLY DISINTEGRATING ORAL EVERY 6 HOURS PRN
Status: DISCONTINUED | OUTPATIENT
Start: 2019-10-13 | End: 2019-10-14

## 2019-10-13 RX ORDER — NALOXONE HYDROCHLORIDE 0.4 MG/ML
.1-.4 INJECTION, SOLUTION INTRAMUSCULAR; INTRAVENOUS; SUBCUTANEOUS
Status: DISCONTINUED | OUTPATIENT
Start: 2019-10-13 | End: 2019-10-14

## 2019-10-13 RX ORDER — CARBOPROST TROMETHAMINE 250 UG/ML
250 INJECTION, SOLUTION INTRAMUSCULAR
Status: DISCONTINUED | OUTPATIENT
Start: 2019-10-13 | End: 2019-10-14

## 2019-10-13 RX ORDER — ACETAMINOPHEN 325 MG/1
650 TABLET ORAL EVERY 4 HOURS PRN
Status: DISCONTINUED | OUTPATIENT
Start: 2019-10-13 | End: 2019-10-14

## 2019-10-13 RX ORDER — EPHEDRINE SULFATE 50 MG/ML
INJECTION, SOLUTION INTRAMUSCULAR; INTRAVENOUS; SUBCUTANEOUS
Status: DISCONTINUED
Start: 2019-10-13 | End: 2019-10-13 | Stop reason: WASHOUT

## 2019-10-13 RX ORDER — LIDOCAINE 40 MG/G
CREAM TOPICAL
Status: DISCONTINUED | OUTPATIENT
Start: 2019-10-13 | End: 2019-10-14

## 2019-10-13 RX ORDER — OXYCODONE AND ACETAMINOPHEN 5; 325 MG/1; MG/1
1 TABLET ORAL
Status: DISCONTINUED | OUTPATIENT
Start: 2019-10-13 | End: 2019-10-14

## 2019-10-13 RX ORDER — NALBUPHINE HYDROCHLORIDE 10 MG/ML
2.5-5 INJECTION, SOLUTION INTRAMUSCULAR; INTRAVENOUS; SUBCUTANEOUS EVERY 6 HOURS PRN
Status: DISCONTINUED | OUTPATIENT
Start: 2019-10-13 | End: 2019-10-14

## 2019-10-13 RX ORDER — METHYLERGONOVINE MALEATE 0.2 MG/ML
200 INJECTION INTRAVENOUS
Status: DISCONTINUED | OUTPATIENT
Start: 2019-10-13 | End: 2019-10-14

## 2019-10-13 RX ORDER — ONDANSETRON 2 MG/ML
4 INJECTION INTRAMUSCULAR; INTRAVENOUS EVERY 6 HOURS PRN
Status: DISCONTINUED | OUTPATIENT
Start: 2019-10-13 | End: 2019-10-14

## 2019-10-13 RX ORDER — SODIUM CHLORIDE, SODIUM LACTATE, POTASSIUM CHLORIDE, CALCIUM CHLORIDE 600; 310; 30; 20 MG/100ML; MG/100ML; MG/100ML; MG/100ML
INJECTION, SOLUTION INTRAVENOUS CONTINUOUS
Status: DISCONTINUED | OUTPATIENT
Start: 2019-10-13 | End: 2019-10-14

## 2019-10-13 RX ORDER — GENTAMICIN SULFATE 100 MG/100ML
1.5 INJECTION, SOLUTION INTRAVENOUS EVERY 8 HOURS
Status: COMPLETED | OUTPATIENT
Start: 2019-10-14 | End: 2019-10-14

## 2019-10-13 RX ORDER — OXYTOCIN/0.9 % SODIUM CHLORIDE 30/500 ML
1-24 PLASTIC BAG, INJECTION (ML) INTRAVENOUS CONTINUOUS
Status: DISCONTINUED | OUTPATIENT
Start: 2019-10-13 | End: 2019-10-14

## 2019-10-13 RX ORDER — ROPIVACAINE HYDROCHLORIDE 2 MG/ML
10 INJECTION, SOLUTION EPIDURAL; INFILTRATION; PERINEURAL ONCE
Status: COMPLETED | OUTPATIENT
Start: 2019-10-13 | End: 2019-10-13

## 2019-10-13 RX ORDER — ACETAMINOPHEN 325 MG/1
975 TABLET ORAL EVERY 6 HOURS
Status: DISCONTINUED | OUTPATIENT
Start: 2019-10-13 | End: 2019-10-14

## 2019-10-13 RX ORDER — AMPICILLIN 2 G/1
2 INJECTION, POWDER, FOR SOLUTION INTRAVENOUS EVERY 6 HOURS
Status: DISCONTINUED | OUTPATIENT
Start: 2019-10-13 | End: 2019-10-14

## 2019-10-13 RX ORDER — IBUPROFEN 400 MG/1
800 TABLET, FILM COATED ORAL
Status: COMPLETED | OUTPATIENT
Start: 2019-10-13 | End: 2019-10-14

## 2019-10-13 RX ORDER — FENTANYL/BUPIVACAINE/NS/PF 2-1250MCG
12 PLASTIC BAG, INJECTION (ML) INJECTION CONTINUOUS
Status: DISCONTINUED | OUTPATIENT
Start: 2019-10-13 | End: 2019-10-14

## 2019-10-13 RX ORDER — MISOPROSTOL 100 UG/1
25 TABLET ORAL
Status: DISCONTINUED | OUTPATIENT
Start: 2019-10-13 | End: 2019-10-14

## 2019-10-13 RX ORDER — OXYTOCIN/0.9 % SODIUM CHLORIDE 30/500 ML
100-340 PLASTIC BAG, INJECTION (ML) INTRAVENOUS CONTINUOUS PRN
Status: COMPLETED | OUTPATIENT
Start: 2019-10-13 | End: 2019-10-14

## 2019-10-13 RX ORDER — EPHEDRINE SULFATE 50 MG/ML
5 INJECTION, SOLUTION INTRAMUSCULAR; INTRAVENOUS; SUBCUTANEOUS
Status: DISCONTINUED | OUTPATIENT
Start: 2019-10-13 | End: 2019-10-14

## 2019-10-13 RX ORDER — OXYTOCIN 10 [USP'U]/ML
10 INJECTION, SOLUTION INTRAMUSCULAR; INTRAVENOUS
Status: DISCONTINUED | OUTPATIENT
Start: 2019-10-13 | End: 2019-10-14

## 2019-10-13 RX ADMIN — Medication 2 MILLI-UNITS/MIN: at 17:29

## 2019-10-13 RX ADMIN — Medication 12 ML/HR: at 11:05

## 2019-10-13 RX ADMIN — GENTAMICIN SULFATE 140 MG: 40 INJECTION, SOLUTION INTRAMUSCULAR; INTRAVENOUS at 19:44

## 2019-10-13 RX ADMIN — SODIUM CHLORIDE, POTASSIUM CHLORIDE, SODIUM LACTATE AND CALCIUM CHLORIDE 1000 ML: 600; 310; 30; 20 INJECTION, SOLUTION INTRAVENOUS at 10:48

## 2019-10-13 RX ADMIN — ACETAMINOPHEN 975 MG: 325 TABLET, FILM COATED ORAL at 19:07

## 2019-10-13 RX ADMIN — AMPICILLIN SODIUM 2 G: 2 INJECTION, POWDER, FOR SOLUTION INTRAVENOUS at 19:08

## 2019-10-13 RX ADMIN — ROPIVACAINE HYDROCHLORIDE 10 ML: 2 INJECTION, SOLUTION EPIDURAL; INFILTRATION at 11:14

## 2019-10-13 RX ADMIN — MISOPROSTOL 25 MCG: 100 TABLET ORAL at 05:54

## 2019-10-13 RX ADMIN — MISOPROSTOL 25 MCG: 100 TABLET ORAL at 08:01

## 2019-10-13 RX ADMIN — Medication 12 ML/HR: at 19:08

## 2019-10-13 ASSESSMENT — MIFFLIN-ST. JEOR: SCORE: 1392.26

## 2019-10-13 ASSESSMENT — ACTIVITIES OF DAILY LIVING (ADL)
TRANSFERRING: 0-->INDEPENDENT
RETIRED_EATING: 0-->INDEPENDENT
BATHING: 0-->INDEPENDENT
DRESS: 0-->INDEPENDENT
SWALLOWING: 0-->SWALLOWS FOODS/LIQUIDS WITHOUT DIFFICULTY
TOILETING: 0-->INDEPENDENT
FALL_HISTORY_WITHIN_LAST_SIX_MONTHS: NO
AMBULATION: 0-->INDEPENDENT
RETIRED_COMMUNICATION: 0-->UNDERSTANDS/COMMUNICATES WITHOUT DIFFICULTY
COGNITION: 0 - NO COGNITION ISSUES REPORTED

## 2019-10-13 NOTE — ANESTHESIA PROCEDURE NOTES
Peripheral nerve/Neuraxial procedure note : epidural catheter  Pre-Procedure  Performed by Chriss Young MD  Location: OB      Pre-Anesthestic Checklist: patient identified, risks and benefits discussed, informed consent, monitors and equipment checked, pre-op evaluation and at physician/surgeon's request    Timeout  Correct Patient: Yes   Correct Procedure: Yes   Correct Site: Yes   Correct Laterality: N/A   Correct Position: Yes   Site Marked: N/A   .   Procedure Documentation    .    Procedure: epidural catheter, .   Patient Position:sitting Insertion Site:L3-4  (midline approach) Injection technique: LORT saline   Local skin infiltrated with mL of 1% lidocaine.  SALLY at 5.5 cm    Patient Prep/Sterile Barriers; chlorhexidine gluconate and isopropyl alcohol.  .  Needle: [employer] needle   Needle Gauge: 17.    Needle Length (Inches) 5   # of attempts: 1 and # of redirects:  .    Catheter: 18 G . .  Catheter threaded easily  .  9.5 cm at skin.   .    Assessment/Narrative  Paresthesias: No.  .  .  Aspiration negative for heme or CSF  . Test dose of 3 mL at. Test dose negative for signs of intravascular, subdural or intrathecal injection. Comments:  Risks, benefits, alternatives of epidural analgesia discussed with the patient who agrees to proceed.  After a procedural timeout confirming the correct patient and details of the procedure, 1% lidocaine was injected superficially over the skin for topical anesthesia.  A 17 G Tuohy needle was advanced at the above lumbar interspace until contact was felt with ligamentum flavum.  Loss of resistance to saline was encountered at above noted depth.  3 ml of saline was injected to expand the epidural space.  The epidural catheter was then easily threaded to the depth noted above.  Paresthesias were as noted above.Ropivacaine 10 ml bolus via epidural catheter administered at end of procedure.  Patient tolerated well.

## 2019-10-13 NOTE — PROVIDER NOTIFICATION
10/13/19 1048 10/13/19 1100 10/13/19 1105   Epidural Placement Care   Epidural Request/Placement epidural requested anesthesiologist at bedside;patient positioned;time out performed epidural test

## 2019-10-13 NOTE — PLAN OF CARE
0720 assumed care of pt and received report/.    00841 had good pain control from epidural.    1300 having minor shakiness so pulse ox heart rate is not accurate at times.

## 2019-10-13 NOTE — H&P
No significant change in general health status based on examination of the patient, review of Nursing Admission Database and prenatal record.    G1 presents at 40.6 weeks with SROM in latent labor.  Pregnancy complicated by +zika test in 1st trimester, likely due to travel to the [facility]. Serial MAGALY have been normal growth.  Fetal echo did show mild tricuspid regurg.   Baby will need serum testing at 1-2 days of life for zika, as well as hearing and vision tests and head US.  Baby also will need cardiac echo after delivery.    Pt given oral cytotec, now in early labor. Cx 1-2/80 per RN.  Cristina regularly and FHT reassuring.  Will start pitocin as needed.  Plans NAKIA.  Expectant mgmt.    Usha Godfrey MD

## 2019-10-13 NOTE — ANESTHESIA PREPROCEDURE EVALUATION
Anesthesia Pre-Procedure Evaluation    Patient: Maryjane Wang   MRN: [id number] : 1995          Preoperative Diagnosis: * No surgery found *        Past Medical History:   Diagnosis Date     No pertinent past medical history      Past Surgical History:   Procedure Laterality Date     NO HISTORY OF SURGERY         Anesthesia Evaluation       history and physical reviewed .             ROS/MED HX    ENT/Pulmonary:  - neg pulmonary ROS     Neurologic:  - neg neurologic ROS     Cardiovascular:  - neg cardiovascular ROS       METS/Exercise Tolerance:     Hematologic:         Musculoskeletal:         GI/Hepatic:  - neg GI/hepatic ROS       Renal/Genitourinary:         Endo:         Psychiatric:         Infectious Disease:         Malignancy:         Other:                     neg OB ROS            Physical Exam  Normal systems: cardiovascular, pulmonary and dental    Airway   Mallampati: II  TM distance: > 3 FB  Neck ROM: full  Mouth opening: > 3 cm    Dental     Cardiovascular       Pulmonary             Lab Results   Component Value Date    WBC 9.0 10/13/2019    HGB 13.7 10/13/2019    HCT 40.9 10/13/2019     10/13/2019     2018    POTASSIUM 4.1 2018    CHLORIDE 109 2018    CO2 26 2018    BUN 13 2018    CR 0.84 2018    GLC 91 2018    TAZ 9.0 2018    ALBUMIN 4.3 2016    PROTTOTAL 7.9 2016    ALT 23 2016    AST 14 2016    ALKPHOS 42 2016    BILITOTAL 0.6 2016    TSH 0.72 2018       Preop Vitals  BP Readings from Last 3 Encounters:   18 108/74   17 111/72   16 92/60    Pulse Readings from Last 3 Encounters:   18 76   17 68   16 74      Resp Readings from Last 3 Encounters:   10/13/19 16   18 20   17 12    SpO2 Readings from Last 3 Encounters:   18 100%   17 100%   16 100%      Temp Readings from Last 1 Encounters:   10/13/19 (P) 36.7  C (98  F) ((P)  Temporal)    Ht Readings from Last 1 Encounters:   10/13/19 1.524 m (5')      Wt Readings from Last 1 Encounters:   10/13/19 72.6 kg (160 lb)    Estimated body mass index is 31.25 kg/m  as calculated from the following:    Height as of this encounter: 1.524 m (5').    Weight as of this encounter: 72.6 kg (160 lb).       Anesthesia Plan      History & Physical Review      ASA Status:  2 .  OB Epidural Asa: 2       Plan for     Risk, benefits and alternatives discussed with patient, who consents to lumbar epidural analgesia.        Postoperative Care      Consents  Anesthetic plan, risks, benefits and alternatives discussed with:  Patient..                 Chriss Young MD

## 2019-10-14 LAB — T PALLIDUM AB SER QL: NONREACTIVE

## 2019-10-14 PROCEDURE — 12000035 ZZH R&B POSTPARTUM

## 2019-10-14 PROCEDURE — 0KQM0ZZ REPAIR PERINEUM MUSCLE, OPEN APPROACH: ICD-10-PCS | Performed by: OBSTETRICS & GYNECOLOGY

## 2019-10-14 PROCEDURE — 25000128 H RX IP 250 OP 636: Performed by: OBSTETRICS & GYNECOLOGY

## 2019-10-14 PROCEDURE — 25000132 ZZH RX MED GY IP 250 OP 250 PS 637: Performed by: OBSTETRICS & GYNECOLOGY

## 2019-10-14 PROCEDURE — 72200001 ZZH LABOR CARE VAGINAL DELIVERY SINGLE

## 2019-10-14 PROCEDURE — 25000125 ZZHC RX 250: Performed by: OBSTETRICS & GYNECOLOGY

## 2019-10-14 RX ORDER — OXYCODONE HYDROCHLORIDE 5 MG/1
5 TABLET ORAL EVERY 4 HOURS PRN
Status: DISCONTINUED | OUTPATIENT
Start: 2019-10-14 | End: 2019-10-16 | Stop reason: HOSPADM

## 2019-10-14 RX ORDER — MISOPROSTOL 200 UG/1
800 TABLET ORAL
Status: DISCONTINUED | OUTPATIENT
Start: 2019-10-14 | End: 2019-10-16 | Stop reason: HOSPADM

## 2019-10-14 RX ORDER — OXYTOCIN 10 [USP'U]/ML
10 INJECTION, SOLUTION INTRAMUSCULAR; INTRAVENOUS
Status: DISCONTINUED | OUTPATIENT
Start: 2019-10-14 | End: 2019-10-16 | Stop reason: HOSPADM

## 2019-10-14 RX ORDER — AMPICILLIN 2 G/1
2 INJECTION, POWDER, FOR SOLUTION INTRAVENOUS ONCE
Status: COMPLETED | OUTPATIENT
Start: 2019-10-14 | End: 2019-10-14

## 2019-10-14 RX ORDER — IBUPROFEN 400 MG/1
800 TABLET, FILM COATED ORAL EVERY 6 HOURS PRN
Status: DISCONTINUED | OUTPATIENT
Start: 2019-10-14 | End: 2019-10-16 | Stop reason: HOSPADM

## 2019-10-14 RX ORDER — BISACODYL 10 MG
10 SUPPOSITORY, RECTAL RECTAL DAILY PRN
Status: DISCONTINUED | OUTPATIENT
Start: 2019-10-16 | End: 2019-10-16 | Stop reason: HOSPADM

## 2019-10-14 RX ORDER — HYDROMORPHONE HYDROCHLORIDE 1 MG/ML
0.3 INJECTION, SOLUTION INTRAMUSCULAR; INTRAVENOUS; SUBCUTANEOUS
Status: DISCONTINUED | OUTPATIENT
Start: 2019-10-14 | End: 2019-10-16 | Stop reason: HOSPADM

## 2019-10-14 RX ORDER — OXYTOCIN/0.9 % SODIUM CHLORIDE 30/500 ML
100 PLASTIC BAG, INJECTION (ML) INTRAVENOUS CONTINUOUS
Status: DISCONTINUED | OUTPATIENT
Start: 2019-10-14 | End: 2019-10-16 | Stop reason: HOSPADM

## 2019-10-14 RX ORDER — CARBOPROST TROMETHAMINE 250 UG/ML
250 INJECTION, SOLUTION INTRAMUSCULAR
Status: DISCONTINUED | OUTPATIENT
Start: 2019-10-14 | End: 2019-10-16 | Stop reason: HOSPADM

## 2019-10-14 RX ORDER — OXYTOCIN/0.9 % SODIUM CHLORIDE 30/500 ML
340 PLASTIC BAG, INJECTION (ML) INTRAVENOUS CONTINUOUS PRN
Status: DISCONTINUED | OUTPATIENT
Start: 2019-10-14 | End: 2019-10-16 | Stop reason: HOSPADM

## 2019-10-14 RX ORDER — METHYLERGONOVINE MALEATE 0.2 MG/ML
200 INJECTION INTRAVENOUS
Status: DISCONTINUED | OUTPATIENT
Start: 2019-10-14 | End: 2019-10-16 | Stop reason: HOSPADM

## 2019-10-14 RX ORDER — NALOXONE HYDROCHLORIDE 0.4 MG/ML
.1-.4 INJECTION, SOLUTION INTRAMUSCULAR; INTRAVENOUS; SUBCUTANEOUS
Status: DISCONTINUED | OUTPATIENT
Start: 2019-10-14 | End: 2019-10-16 | Stop reason: HOSPADM

## 2019-10-14 RX ORDER — ACETAMINOPHEN 325 MG/1
650 TABLET ORAL EVERY 4 HOURS PRN
Status: DISCONTINUED | OUTPATIENT
Start: 2019-10-14 | End: 2019-10-16 | Stop reason: HOSPADM

## 2019-10-14 RX ORDER — LANOLIN 100 %
OINTMENT (GRAM) TOPICAL
Status: DISCONTINUED | OUTPATIENT
Start: 2019-10-14 | End: 2019-10-16 | Stop reason: HOSPADM

## 2019-10-14 RX ORDER — HYDROCORTISONE 2.5 %
CREAM (GRAM) TOPICAL 3 TIMES DAILY PRN
Status: DISCONTINUED | OUTPATIENT
Start: 2019-10-14 | End: 2019-10-16 | Stop reason: HOSPADM

## 2019-10-14 RX ORDER — AMOXICILLIN 250 MG
1 CAPSULE ORAL 2 TIMES DAILY
Status: DISCONTINUED | OUTPATIENT
Start: 2019-10-14 | End: 2019-10-16 | Stop reason: HOSPADM

## 2019-10-14 RX ORDER — AMOXICILLIN 250 MG
2 CAPSULE ORAL 2 TIMES DAILY
Status: DISCONTINUED | OUTPATIENT
Start: 2019-10-14 | End: 2019-10-16 | Stop reason: HOSPADM

## 2019-10-14 RX ADMIN — LIDOCAINE HYDROCHLORIDE 20 ML: 10 INJECTION, SOLUTION INFILTRATION; PERINEURAL at 01:40

## 2019-10-14 RX ADMIN — ACETAMINOPHEN 650 MG: 325 TABLET, FILM COATED ORAL at 03:33

## 2019-10-14 RX ADMIN — SENNOSIDES AND DOCUSATE SODIUM 1 TABLET: 8.6; 5 TABLET ORAL at 19:47

## 2019-10-14 RX ADMIN — IBUPROFEN 800 MG: 400 TABLET ORAL at 16:22

## 2019-10-14 RX ADMIN — IBUPROFEN 800 MG: 400 TABLET ORAL at 03:33

## 2019-10-14 RX ADMIN — SENNOSIDES AND DOCUSATE SODIUM 1 TABLET: 8.6; 5 TABLET ORAL at 08:14

## 2019-10-14 RX ADMIN — AMPICILLIN SODIUM 2 G: 2 INJECTION, POWDER, FOR SOLUTION INTRAVENOUS at 01:06

## 2019-10-14 RX ADMIN — ACETAMINOPHEN 650 MG: 325 TABLET, FILM COATED ORAL at 09:20

## 2019-10-14 RX ADMIN — AMPICILLIN SODIUM 2 G: 2 INJECTION, POWDER, FOR SOLUTION INTRAMUSCULAR; INTRAVENOUS at 07:03

## 2019-10-14 RX ADMIN — GENTAMICIN SULFATE 100 MG: 100 INJECTION, SOLUTION INTRAVENOUS at 03:27

## 2019-10-14 RX ADMIN — ACETAMINOPHEN 650 MG: 325 TABLET, FILM COATED ORAL at 22:01

## 2019-10-14 RX ADMIN — IBUPROFEN 800 MG: 400 TABLET ORAL at 22:02

## 2019-10-14 RX ADMIN — ACETAMINOPHEN 650 MG: 325 TABLET, FILM COATED ORAL at 16:22

## 2019-10-14 RX ADMIN — IBUPROFEN 800 MG: 400 TABLET ORAL at 09:20

## 2019-10-14 RX ADMIN — Medication 340 ML/HR: at 01:39

## 2019-10-14 NOTE — PLAN OF CARE
Per Dr Godfrey patient is to receive one more dose of Gentamicin (@0300) and Ampicillin (@0700)    RN updated that patient has been tachycardic. -140 at times. 500 ml bolus of fluid given during labor for tera colored urine. Notify MD if HR is 150 or greater.

## 2019-10-14 NOTE — PLAN OF CARE
1830 MAternal temp of 102.3 fetal baseline is rising at 155-165.  1845 MD contacted   Start Chorio protocll  1907 Tylenol 975 amp 2 grms given per order.    Pt and spouse educated on POC with Chorio  NICU aware.

## 2019-10-14 NOTE — L&D DELIVERY NOTE
DELIVERY SUMMARY:  Date: 10/14/2019     HISTORY:  Maryjane Wang is a 23 year old  at 41w0d gestation. Prenatal course complicated by Zika infection documented in 1st trimester, mild tricuspid regurgitation on fetal echo. GBS negative.  She is Rh positive and Rubella immune.      FIRST STAGE:  She presented to labor and delivery with SROM in latent labor.  Amniotic fluid was noted to be meconium stained at the time of SROM.  She progressed to complete at 0025.  NAKIA analgesia.  She was diagnosed with chorioamnionitis in labor and was started on IV ampicillin and gentamicin.    SECOND STAGE:   Fetal heart tones were reassuring during the second stage of labor.  Head delivered OA over intact perineum.  Loose nuchal cord. Shoulders were delivered without difficulty and the remainder of the body followed.  The female infant was placed directly on the maternal abdomen and the infant was bulb suctioned.  Cord clamping was delayed 60 seconds after which the cord was clamped and cut.  Cord blood was obtained.  IV Pitocin was given through running IV.  Apgar 8 at 1 minute and 9 at 5 minutes.  Weight 7-4.    THIRD STAGE:  Pitocin was administered after delivery of the infant.  The placenta delivered spontaneously with gentle cord traction.  An irregular second degree perineal laceration was repaired with 3-0 chromic suture in the usual fashion.  The uterus was noted to be firm.  Sponge and needle counts were correct.  Quantitated blood loss was 400 ml + laps.  Mom and baby doing well and stable to transfer to postpartum recovery.    Usha Godfrey MD  Obstetrics, Gynecology, and Infertility

## 2019-10-14 NOTE — PLAN OF CARE
Patient doing well, denies pain other than mild intermittent uterine cramping.  Taking ibuprofen and tylenol PO when available.  Vital signs stable.  IV saline locked.  Encouraged ambulation with another person to NICU to visit infant if patient feels up to it (encouraged to take wheelchair along for the first walk).  Ambulating to bathroom without difficulty, voiding adequate amounts.   at bedside and supportive.  Assisted with use of breast pump.  Will continue to monitor.

## 2019-10-14 NOTE — PLAN OF CARE
of female infant @ 0135. Bleeding controlled, fundus firm during recovery.  Able to walk and void after delivery. Did get a little dizzy with ambulation. Received last dose of gentamicin, will receive last dose of ampicillin @ 0700. Temp WNL. Tylenol and Ibuprofen for pain. Spouse at bedside, supportive. Patient and spouse visiting infant in NICU/working on breast feeding there.

## 2019-10-14 NOTE — PROGRESS NOTES
Labor progress note    Pt is now 8/90/-1, no change over last 2 hours.  Pitocin at 6 cm and FHT reassuring overall.  Will increase pitocin as able to.  Comfortable with NAKIA.  Pt had temp of 102.3 at 1830, diagnosed with chorioamnionitis and started on IV amp/gent.  Temp now normal.      Usha Godfrey MD

## 2019-10-14 NOTE — PROVIDER NOTIFICATION
10/13/19 2145   Provider Notification   Provider Name/Title Dr Godfrey   Method of Notification In Department   Request Evaluate in Person   Notification Reason Decels;SVE;Labor Status   Dr Godfrey here. Updated that SVE was unchanged from last check (8/90/-1) FHR had late decel x2, moderate variability. Dr Godfrey reviewed strip.     Continue to monitor.

## 2019-10-14 NOTE — PLAN OF CARE
Patient transferred to room 435 from L&D, with . Report received from Mary LUCAS, RN and fundal assessment completed together. Patient and  oriented to unit, room, call system, and patient folder/paperwork. Call light within reach and all questions answered. Encouraged to call RN with needs, will continue to monitor.

## 2019-10-14 NOTE — PROVIDER NOTIFICATION
10/13/19 1845   Provider Notification   Provider Name/Title Brown   Method of Notification Phone   Request Evaluate - Remote   Notification Reason Fetal Baseline Change;Maternal Vital Sign Change;Status Update

## 2019-10-14 NOTE — PROGRESS NOTES
[facility] Obstetrics Postpartum Progress Note  10/14/2019     S: Pt doing well. Pain is well controlled. Bleeding Moderate. Infant is being  but in NICU for chorio. Voiding spontaneously.    O:  /73   Temp 98  F (36.7  C) (Oral)   Resp 16   Ht 1.524 m (5')   Wt 72.6 kg (160 lb)   LMP 2018   SpO2 96%   BMI 31.25 kg/m     Gen: healthy, alert and no distress    Resp: nonlabored breathing  Abd: soft, nondistended, appropriately TTP, FF at u  Ext: non-tender, 1+ edema    Hemoglobin   Date Value Ref Range Status   10/13/2019 13.7 11.7 - 15.7 g/dL Final   2018 13.1 11.7 - 15.7 g/dL Final     Lab Results   Component Value Date    ABO O 10/13/2019    RH Pos 10/13/2019    AS Neg 10/13/2019    RUBELLAABIGG Imm 2019       A: 23 year old  PPD#0 s/p    Dx with chorio and given gent and amp  P:   Routine postpartum cares.    Ambulation encouraged  Monitor wound for signs of infection  Pain control measures as needed  Reportable signs and symptoms dicussed with the patient  Anticipate discharge in 2 days      Shira Martinez, DO   Obstetrics, Gynecology, and Infertility

## 2019-10-14 NOTE — PLAN OF CARE
Data: Maryjane Wang transferred to room 435 via wheelchair at 0455. Baby remains in NICU.  Action: Receiving unit notified of transfer: Yes. Patient and family notified of room change. Report given to Martha LUCAS RN at 0455. Belongings sent to receiving unit. Accompanied by Registered Nurse. Oriented patient to surroundings. Call light within reach. ID bands double-checked with receiving RN.  Response: Patient tolerated transfer and is stable.

## 2019-10-15 LAB — HGB BLD-MCNC: 11.5 G/DL (ref 11.7–15.7)

## 2019-10-15 PROCEDURE — 36415 COLL VENOUS BLD VENIPUNCTURE: CPT | Performed by: OBSTETRICS & GYNECOLOGY

## 2019-10-15 PROCEDURE — 12000035 ZZH R&B POSTPARTUM

## 2019-10-15 PROCEDURE — 85018 HEMOGLOBIN: CPT | Performed by: OBSTETRICS & GYNECOLOGY

## 2019-10-15 PROCEDURE — 25000132 ZZH RX MED GY IP 250 OP 250 PS 637: Performed by: OBSTETRICS & GYNECOLOGY

## 2019-10-15 RX ADMIN — SENNOSIDES AND DOCUSATE SODIUM 1 TABLET: 8.6; 5 TABLET ORAL at 21:37

## 2019-10-15 RX ADMIN — ACETAMINOPHEN 650 MG: 325 TABLET, FILM COATED ORAL at 13:06

## 2019-10-15 RX ADMIN — IBUPROFEN 800 MG: 400 TABLET ORAL at 06:46

## 2019-10-15 RX ADMIN — ACETAMINOPHEN 650 MG: 325 TABLET, FILM COATED ORAL at 06:46

## 2019-10-15 RX ADMIN — IBUPROFEN 800 MG: 400 TABLET ORAL at 13:06

## 2019-10-15 RX ADMIN — ACETAMINOPHEN 650 MG: 325 TABLET, FILM COATED ORAL at 19:14

## 2019-10-15 RX ADMIN — SENNOSIDES AND DOCUSATE SODIUM 2 TABLET: 8.6; 5 TABLET ORAL at 08:51

## 2019-10-15 RX ADMIN — IBUPROFEN 800 MG: 400 TABLET ORAL at 19:14

## 2019-10-15 NOTE — LACTATION NOTE
Initial visit:     LC was under the impression that mom was going to be pumping and bottle feeding EBM. However, upon discussion with mom and dad, dad asked about bringing infant to breast. So then LC asked mom what her feeding plan was and mom expressed interest in breastfeeding. It was time to feed infant; LC offered to help position infant to help achieve a correct latch. Mom stated she was shy and declined LC assistance. LC then talked mom through the steps of achieving a good latch. LC also suggested the importance of having a LC or RN perform a latch check during a breastfeeding session to ensure proper technique. Mom has been pumping and bottling with Sim.     Encouraged rooming in, skin to skin, feeding on demand 8-12x/day or sooner if baby cues. No further questions at this time. Will follow as needed.     Janie Palumbo RN, Lactation Educator

## 2019-10-15 NOTE — PLAN OF CARE
Vss, afebrile.  Up ad ernesto doing well.  Encouraged to pump.  Comfortable with baby cares, will continue to monitor and assess.

## 2019-10-15 NOTE — PROGRESS NOTES
PPD 1  S.  Doing well, able to void  O.  AVSS  BP 98/60   Pulse 86   Temp 97.4  F (36.3  C) (Oral)   Resp 16   Ht 1.524 m (5')   Wt 72.6 kg (160 lb)   LMP 12/24/2018   SpO2 96%   Breastfeeding? Unknown   BMI 31.25 kg/m    Fundus firm, nontender  Recent Labs   Lab 10/13/19  0433   HGB 13.7   A.  Appropriate recovery, treated for chorio, afebrile, baby in SCN  P.  Home tomorrow, orders done./BayronMD

## 2019-10-15 NOTE — PLAN OF CARE
VSS on RA. Taking motrin and tylenol with adequate pain relief. Fundus firm, U/1. Pumping and bottle feeding Similac. Voiding without difficulty. Will continue to monitor per POC.

## 2019-10-15 NOTE — PLAN OF CARE
Patient has stable vital signs.  Taking tylenol and ibuprofen every 6 hours for mild discomfort.  Using ice and tucks prn to swollen perineum.  Voiding without difficulty.  Baby transferred up around 1700 from NICU.    Baby cares/safety reviewed with both patient and father of baby.  Continue to monitor.

## 2019-10-15 NOTE — ANESTHESIA POSTPROCEDURE EVALUATION
Patient: Maryjane Wang    * No procedures listed *    Diagnosis:* No pre-op diagnosis entered *  Diagnosis Additional Information: No value filed.    Anesthesia Type:  No value filed.    Note:  Anesthesia Post Evaluation    Patient location during evaluation: Floor  Patient participation: Able to fully participate in evaluation  Level of consciousness: awake and alert  Cardiovascular status: acceptable  Respiratory status: acceptable       Comments: Patient satisfied with epidural experience.  She denies lower extremity numbness and weakness and has had no difficulty with ambulation.  She denies pain/tenderness at epidural site.            Last vitals:  Vitals:    10/14/19 0830 10/14/19 1232 10/14/19 1610   BP: 124/78 111/70 100/63   Pulse:   86   Resp: 16 16 16   Temp: 36.5  C (97.7  F) 36.4  C (97.6  F) 36.4  C (97.5  F)   SpO2:            Electronically Signed By: Chriss Young MD  October 14, 2019  7:35 PM

## 2019-10-16 VITALS
DIASTOLIC BLOOD PRESSURE: 63 MMHG | OXYGEN SATURATION: 96 % | RESPIRATION RATE: 16 BRPM | WEIGHT: 160 LBS | TEMPERATURE: 97.7 F | HEIGHT: 60 IN | BODY MASS INDEX: 31.41 KG/M2 | HEART RATE: 86 BPM | SYSTOLIC BLOOD PRESSURE: 117 MMHG

## 2019-10-16 PROCEDURE — 25000132 ZZH RX MED GY IP 250 OP 250 PS 637: Performed by: OBSTETRICS & GYNECOLOGY

## 2019-10-16 RX ADMIN — ACETAMINOPHEN 650 MG: 325 TABLET, FILM COATED ORAL at 00:57

## 2019-10-16 RX ADMIN — ACETAMINOPHEN 650 MG: 325 TABLET, FILM COATED ORAL at 07:15

## 2019-10-16 RX ADMIN — IBUPROFEN 800 MG: 400 TABLET ORAL at 00:57

## 2019-10-16 RX ADMIN — SENNOSIDES AND DOCUSATE SODIUM 2 TABLET: 8.6; 5 TABLET ORAL at 07:58

## 2019-10-16 RX ADMIN — IBUPROFEN 800 MG: 400 TABLET ORAL at 07:15

## 2019-10-16 NOTE — LACTATION NOTE
Routine and discharge visit. Mother states feedings are going well.  Mother states she attempted to breast feed overnight but infant didn't want to latch.  Mother states she will continue to try to breast feed but continues to decline assistance or help from LC.  Encouraged to breast feed first and then supplement with formula.  No further questions at this time.    Ann Smith RN, IBCLC

## 2019-10-16 NOTE — PROGRESS NOTES
[facility] Obstetrics Postpartum Progress Note  10/16/2019     S: Pt doing well. Pain is well controlled. Bleeding Light. Infant is being . Voiding spontaneously.    O:  /63   Pulse 86   Temp 97.7  F (36.5  C) (Oral)   Resp 16   Ht 1.524 m (5')   Wt 72.6 kg (160 lb)   LMP 2018   SpO2 96%   Breastfeeding? Unknown   BMI 31.25 kg/m     Gen: healthy, alert and no distress    Resp: nonlabored breathing  Abd: soft, nondistended, appropriately TTP, FF at u  Ext: non-tender, no edema    Hemoglobin   Date Value Ref Range Status   10/15/2019 11.5 (L) 11.7 - 15.7 g/dL Final   10/13/2019 13.7 11.7 - 15.7 g/dL Final     Lab Results   Component Value Date    ABO O 10/13/2019    RH Pos 10/13/2019    AS Neg 10/13/2019    RUBELLAABIGG Imm 2019       A: 23 year old  PPD#2 s/p    Treated for chorio-afeb  P:   Routine postpartum cares.    Ambulation encouraged  Monitor wound for signs of infection  Pain control measures as needed  Reportable signs and symptoms dicussed with the patient  Discharge later today      Shira Martinez, DO   Obstetrics, Gynecology, and Infertility

## 2019-10-16 NOTE — PLAN OF CARE
Patient doing well overnight. Taking tylenol and ibuprofen for discomfort. Voiding without difficulty. Bottle feeding overnight. Anticipate discharge later today.

## 2019-10-16 NOTE — PLAN OF CARE
Vss, afebrile.  Fundus firm, scant lochia.  Working on breastfeeding.  Pain well managed with pain meds.  Parents comfortable with baby cares.   Discharge teaching given to pt, will follow up in clinic.

## 2019-10-22 ENCOUNTER — DOCUMENTATION ONLY (OUTPATIENT)
Dept: CARE COORDINATION | Facility: CLINIC | Age: 24
End: 2019-10-22

## 2019-10-22 NOTE — PROGRESS NOTES
[facility] will be sharing updates with you on Maternal Child Health Referral requests for home care services.  This is for care coordination purposes and alert you to referral status.  We received the referral for  Maryjane Wang; MRN [id number] and want to update you:    [facility] has made 2 attempts to contact patient by phone and text message over the last 4 days.   We have not had any response from patient.  Final message was left advising patient to follow up with Primary Care Providers for mom and baby.    Sincerely [facility]  Jj De La Cruz  [phone]

## 2019-12-04 ENCOUNTER — OFFICE VISIT (OUTPATIENT)
Dept: INTERNAL MEDICINE | Facility: CLINIC | Age: 24
End: 2019-12-04
Payer: COMMERCIAL

## 2019-12-04 VITALS
HEART RATE: 78 BPM | DIASTOLIC BLOOD PRESSURE: 74 MMHG | WEIGHT: 150.1 LBS | BODY MASS INDEX: 29.47 KG/M2 | TEMPERATURE: 97.9 F | OXYGEN SATURATION: 100 % | SYSTOLIC BLOOD PRESSURE: 104 MMHG | HEIGHT: 60 IN

## 2019-12-04 DIAGNOSIS — L03.012 PARONYCHIA OF LEFT MIDDLE FINGER: Primary | ICD-10-CM

## 2019-12-04 PROCEDURE — 99213 OFFICE O/P EST LOW 20 MIN: CPT | Performed by: INTERNAL MEDICINE

## 2019-12-04 RX ORDER — CEPHALEXIN 500 MG/1
500 CAPSULE ORAL 4 TIMES DAILY
Qty: 28 CAPSULE | Refills: 0 | Status: SHIPPED | OUTPATIENT
Start: 2019-12-04 | End: 2019-12-11

## 2019-12-04 RX ORDER — NORETHINDRONE 0.35 MG/1
TABLET ORAL
Refills: 3 | COMMUNITY
Start: 2019-11-21

## 2019-12-04 ASSESSMENT — MIFFLIN-ST. JEOR: SCORE: 1342.35

## 2019-12-04 NOTE — NURSING NOTE
Chief Complaint   Patient presents with     Hand Pain     LT middle finger      /74   Pulse 78   Temp 97.9  F (36.6  C) (Temporal)   Ht 1.524 m (5')   Wt 68.1 kg (150 lb 1.6 oz)   LMP 11/24/2019 (Approximate)   SpO2 100%   BMI 29.31 kg/m   Estimated body mass index is 29.31 kg/m  as calculated from the following:    Height as of this encounter: 1.524 m (5').    Weight as of this encounter: 68.1 kg (150 lb 1.6 oz).        Health Maintenance that is potentially due pending provider review:  Pap Smear    Pt has appt with OBGYN next week.    SKIP Nielsen

## 2019-12-04 NOTE — PATIENT INSTRUCTIONS
PARONYCHIA:        *  Infection of the toenail or fingernail.     *  Antibiotic since it has been worsening:     --Cephalexin 500 mg four times per day for 7 days.    *  Clean and soak in warm water 3-4 times per day    *  Gently push the cuticle skin away from the nail with a cuticle stick. Do not dig into the tissue, gently push it away from the nail.     *  Apply antibiotic ointment to the area 2-3 times per day    * If there is increasing swelling, redness, and/or pain, these are signs and symptoms of a worsening infection, let us know immediately if this happens.

## 2019-12-04 NOTE — PROGRESS NOTES
Subjective     Maryjane Wang is a 24 year old female who presents to clinic today for the following health issues:    HPI   Finger Problem      Duration: noticed 5 days ago    Description (location/character/radiation): LT middle finger     Intensity:  moderate    Accompanying signs and symptoms: swollen, infected around nail and skin    History (similar episodes/previous evaluation): None    Precipitating or alleviating factors: None    Therapies tried and outcome: soaked finger in hydrogen peroxide and warm water, ice and ibuprofen - helps with pain               Reviewed and updated as needed this visit by Provider         Review of Systems         Objective    LMP 12/24/2018   There is no height or weight on file to calculate BMI.  Physical Exam                 Past Medical History:  ---------------------------  Past Medical History:   Diagnosis Date     No pertinent past medical history        Past Surgical History:  ---------------------------  Past Surgical History:   Procedure Laterality Date     NO HISTORY OF SURGERY         Current Medications:  ---------------------------  Current Outpatient Medications   Medication Sig Dispense Refill     AUREA 0.35 MG tablet take 1 tablet by oral route once daily  3     Prenatal Vit-Fe Fumarate-FA (PRENATAL VITAMIN PO) Take 1 tablet by mouth daily         Allergies:  -------------  No Known Allergies    Social History:  -------------------  Social History     Socioeconomic History     Marital status:      Spouse name: Not on file     Number of children: Not on file     Years of education: Not on file     Highest education level: Not on file   Occupational History     Not on file   Social Needs     Financial resource strain: Not on file     Food insecurity:     Worry: Not on file     Inability: Not on file     Transportation needs:     Medical: Not on file     Non-medical: Not on file   Tobacco Use     Smoking status: Never Smoker     Smokeless tobacco: Never  Used     Tobacco comment: family member sometimes smokes in the car   Substance and Sexual Activity     Alcohol use: Yes     Alcohol/week: 0.0 standard drinks     Comment: once per month     Drug use: No     Sexual activity: Yes     Partners: Male   Lifestyle     Physical activity:     Days per week: Not on file     Minutes per session: Not on file     Stress: Not on file   Relationships     Social connections:     Talks on phone: Not on file     Gets together: Not on file     Attends Caodaism service: Not on file     Active member of club or organization: Not on file     Attends meetings of clubs or organizations: Not on file     Relationship status: Not on file     Intimate partner violence:     Fear of current or ex partner: Not on file     Emotionally abused: Not on file     Physically abused: Not on file     Forced sexual activity: Not on file   Other Topics Concern     Parent/sibling w/ CABG, MI or angioplasty before 65F 55M? No   Social History Narrative     Not on file       Family Medical History:  ------------------------------  Family History   Problem Relation Age of Onset     Family History Negative Mother      Unknown/Adopted Father 78        unknown reason         ROS:  REVIEW OF SYSTEMS:    RESP: negative for cough, dyspnea, wheezing, hemoptysis  CV: negative for chest pain, palpitations, PND, GOODEN, orthopnea; reports no significant changes in their ability to perform physical activity (from cardiovascular standpoint)  GI: negative for dysphagia, N/V, pain, melena, diarrhea and constipation  NEURO: negative for new numbness/tingling, paralysis, incoordination, or focal weakness     OBJECTIVE:                                                    /74   Pulse 78   Temp 97.9  F (36.6  C) (Temporal)   Ht 1.524 m (5')   Wt 68.1 kg (150 lb 1.6 oz)   LMP 11/24/2019 (Approximate)   SpO2 100%   BMI 29.31 kg/m       FINGER: Swelling erythema on the lateral aspect of the left middle finger nail.  Some  mild very minimal fluctuance in this area, but no definite fluid collection.    GENERAL alert and no distress  EYES:  Normal sclera,conjunctiva, EOMIMS: extremities- no gross deformities of the visible extremities noted,   EXT:  no lower extremity edema  PSYCH: Alert and oriented times 3; speech- coherent  SKIN:  No obvious significant skin lesions on visible portions of face          ASSESSMENT/PLAN:                                                      (L03.012) Paronychia of left middle finger  (primary encounter diagnosis)  Comment: Symptoms have been worsening over the last few days.   Cephalexin antibiotic for the next 5 to 7 days.  Gentle nail care with a cuticle stick.  No indication for incision and drainage at this time.  Told patient report the emergency room if the swelling, pain, significantly worsens  at this may indicate the need for an incision and drainage.  Plan: cephALEXin (KEFLEX) 500 MG capsule             See Patient Instructions    INDIA LIZAMA M.D., MD  [facility]    (Chart documentation may have been completed, in part, with enymotion voice-recognition software. Even though reviewed, some grammatical, spelling, and word errors may remain.)

## 2020-03-02 ENCOUNTER — HEALTH MAINTENANCE LETTER (OUTPATIENT)
Age: 25
End: 2020-03-02

## 2020-11-24 ENCOUNTER — PRE VISIT (OUTPATIENT)
Dept: MATERNAL FETAL MEDICINE | Facility: CLINIC | Age: 25
End: 2020-11-24

## 2020-11-24 ENCOUNTER — TRANSCRIBE ORDERS (OUTPATIENT)
Dept: MATERNAL FETAL MEDICINE | Facility: CLINIC | Age: 25
End: 2020-11-24

## 2020-11-24 DIAGNOSIS — O26.90 PREGNANCY RELATED CONDITION, ANTEPARTUM: Primary | ICD-10-CM

## 2020-11-24 NOTE — PROGRESS NOTES
"[facility]  Genetic Counseling Consult    Patient: Maryjane Wang YOB: 1995   Date of Service:  20      Maryjane Wang was seen at the [facility] for genetic consultation given abnormal ultrasound findings at her primary OB provider's office.      Impression/Plan:   Maryjane had an ultrasound and blood draw for NIPT (Innatal test through [facility]).  Results are expected within 5-7 days, and will be available in EPIC. We will contact her to discuss the results, and a copy will be forwarded to the office of the referring OB provider.  Maryjane requested that I leave a detailed voicemail with her NIPT results and predicted fetal sex information if she does not answer the phone.    Maryjane has had two ultrasounds at her primary OB provider's clinic. The first ultrasound on , when Maryjane was 10w6d gestation, was reported to have a \"cystic hygroma measuring 5.7mm\".  The second ultrasound on , when Maryjane was 12w6d gestation, was reported to have a thickened NT measuring 6mm. It was noted on the second ultrasound the cystic hygroma resolved.    Today, Maryjane and I discussed the ultrasound finding of a thick nuchal translucency, as well as the risks associated with this ultrasound finding. We went on to review aneuploidy screening vs diagnostic testing options both prenatally and postnatally. After our discussion, Maryjane elected to proceed with NIPT today.    Pregnancy History:   /Parity:                            Age at Delivery: 25 year old  TRE: 2021, by Last Menstrual Period                  Gestational Age: 13w0d  -  No significant complications or exposures were reported in the current pregnancy.  -  Maryjane s pregnancy history is significant for:   - 41w0d  female 2019 ([place])       -Maryjane was followed that the [facility] clinic during this pregnancy due to Zika exposure in the " first trimester when she travelled to the [facility]. Notably, Maryjane plans to go back to the [facility] 2020 through 2021.    Medical History:   Maryjane s reported medical history is not expected to impact pregnancy management or risks to fetal development.       Family History:   A three-generation pedigree was obtained, and is scanned under the  Media  tab.   The following significant findings were reported by Maryjane:    Maryjane stated that she has one full sibling. He is 24 years old and healthy.    Maryjane also stated that she had thirty-six half siblings. She stated that overall they all were healthy.    Maryjane's father passed away at a young age when Maryjane was 5 years old. She had limited information regarding his health history during our discussion today. She reported he was a heavy smoker.    Maryjane's partner, Martin, is 49 years old and has arthritis.    Martin's mother reportedly had 3 miscarriages and no other children.    Otherwise, the reported family history is negative for multiple miscarriages, stillbirths, birth defects, cognitive impairment, known genetic conditions, and consanguinity.       Carrier Screening:   The patient reports that the father of the pregnancy has  ancestry:      Cystic fibrosis is an autosomal recessive genetic condition that occurs with increased frequency in individuals of  ancestry and carrier screening for this condition is available.  In addition,  screening in the [facility] includes cystic fibrosis.    The patient reports that she is of  ancestry:      The hemoglobinopathies are a group of genetic blood diseases that occur with increased frequency in individuals of  ancestry and carrier screening for these conditions is available.  Carrier screening for the hemoglobinopathies includes a CBC with red blood cell indices, a ferritin level, and a quantitative hemoglobin electrophoresis or HPLC.  In addition,   screening in the [facility] includes many of the hemoglobinopathies.      Expanded carrier screening for mutations in a large panel of genes associated with autosomal recessive conditions including cystic fibrosis, spinal muscular atrophy, and others, is now available.      Carrier screening was beyond the scope of our discussion today.        Risk Assessment:   We explained that the risk for fetal chromosome abnormalities increases with maternal age. We discussed specific features of common chromosome abnormalities, including Down syndrome, trisomy 13, trisomy 18, and sex chromosome trisomies.      - At age 25 at midtrimester, the risk to have a baby with Down syndrome is 1 in 928.     - At age 25 at midtrimester, the risk to have a baby with any chromosome abnormality is 1 in 464.     - At age 25 at delivery, the risk to have a baby with Down syndrome is 1 in 1111.     - At age 25 at delivery, the risk to have a baby with any chromosome abnormality is 1 in 455.       We reviewed the increased nuchal translucency finding on her most recent ultrasound. The measurement was 6 mm. Today, Maryjane is having an ultrasound at the [facility] clinic to get an updated measurement.    Nuchal translucency refers to a region between the skin and soft tissues behind the cervical spine. This space is expected to have an amount of fluid between the 10th and 14th weeks of gestation and is considered normal below a defined threshold. The nuchal translucency measurement is taken when the fetus measures a crown-rump length between 36 to 84 mm which corresponds to approximately the 10th to 14 week of gestation. Typically any measurement over 3.0 mm or the 95th percentile is concerning.     Increased nuchal translucency has been associated with an increased risk for aneuploidy, heart defects, and other more rare genetic conditions. Approximately 20-65% of babies who have an increased nuchal translucency on ultrasound may have an  associated chromosome abnormality, most commonly Down syndrome or Gonzalez syndrome. As the measurement increases the chance of aneuploidy, fetal death, and major anomalies increases.  Increased nuchal translucency has been associated with a 5-20% risk for a heart defect.     Increased nuchal translucency can resolve spontaneously by the second trimester and this is reassuring in the context of a normal euploid fetus. However, due to the finding of an increased nuchal translucency, extra ultrasounds and possibly a fetal echocardiogram may still be indicated. If the increased nuchal translucency remains or progresses there is concern for an underlying abnormality even in the context of normal screening or diagnostic chromosome analysis.     A cystic hygroma is a congenital malformation resulting from lymph accumulation and are often large in size and extend further down the length of the fetus than an increased nuchal translucency. Cystic hygromas are generally more concerning than an increased nuchal translucency. The mean size of a first trimester cystic hygroma is 8mm with some measuring over 30mm. Cystic hygromas can be septated or simple. Cystic hygromas are associated with an increased risk for aneuploidy and structural malformations which can increase the risk for miscarriages, hydrops, fetal demise, and  death.  A septated cystic hygroma does have increased risk for aneuploidy compared to simple. Furthermore, one third of euploid fetuses with first trimester septated cystic hygromas have major structural anomalies, primarily cardiac and skeletal. In comparison, over 80% of euploidy fetuses with first trimester simple cystic hygromas have them resolve within four weeks and are phenotypically normal     We discussed the options of screening by NIPT or diagnostic options such as a CVS or amniocentesis. Maryjane elected to proceed with NIPT today.        Testing Options:   We discussed the following  options:   Non-invasive Prenatal Testing (NIPT)    Maternal plasma cell-free DNA testing; first trimester ultrasound with nuchal translucency and nasal bone assessment is recommended, when appropriate    Screens for fetal trisomy 21, trisomy 13, trisomy 18, and sex chromosome aneuploidy    Cannot screen for open neural tube defects; maternal serum AFP after 15 weeks is recommended       Chorionic villus sampling (CVS)    Invasive procedure typically performed in the first trimester by which placental villi are obtained for the purpose of chromosome analysis and/or other prenatal genetic analysis    Diagnostic results; >99% sensitivity for fetal chromosome abnormalities    Cannot test for open neural tube defects; maternal serum AFP after 15 weeks is recommended       Genetic Amniocentesis    Invasive procedure typically performed in the second trimester by which amniotic fluid is obtained for the purpose of chromosome analysis and/or other prenatal genetic analysis    Diagnostic results; >99% sensitivity for fetal chromosome abnormalities    AFAFP measurement tests for open neural tube defects       Comprehensive (Level II) ultrasound: Detailed ultrasound performed between 18-22 weeks gestation to screen for major birth defects and markers for aneuploidy.      We reviewed the benefits and limitations of this testing.  Screening tests provide a risk assessment specific to the pregnancy for certain fetal chromosome abnormalities, but cannot definitively diagnose or exclude a fetal chromosome abnormality.  Follow-up genetic counseling and consideration of diagnostic testing is recommended with any abnormal screening result.     Diagnostic tests carry inherent risks- including risk of miscarriage- that require careful consideration.  These tests can detect fetal chromosome abnormalities with greater than 99% certainty.  Results can be compromised by maternal cell contamination or mosaicism, and are limited by the  resolution of cytogenetic G-banding technology.  There is no screening nor diagnostic test that can detect all forms of birth defects or mental disability.    It was a pleasure to be involved with Maryjane s care. Face-to-face time of the meeting was 35 minutes.    Iza Sandoval MS, [facility]  Genetic Counselor  Maternal Fetal Medicine  [facility]   Phone: [phone]  Pager: [phone]  Email: [email or web address]

## 2020-11-25 ENCOUNTER — OFFICE VISIT (OUTPATIENT)
Dept: MATERNAL FETAL MEDICINE | Facility: CLINIC | Age: 25
End: 2020-11-25
Attending: OBSTETRICS & GYNECOLOGY
Payer: COMMERCIAL

## 2020-11-25 ENCOUNTER — HOSPITAL ENCOUNTER (OUTPATIENT)
Dept: ULTRASOUND IMAGING | Facility: CLINIC | Age: 25
End: 2020-11-25
Attending: OBSTETRICS & GYNECOLOGY
Payer: COMMERCIAL

## 2020-11-25 DIAGNOSIS — O28.3 ABNORMAL FETAL ULTRASOUND: ICD-10-CM

## 2020-11-25 DIAGNOSIS — O26.90 PREGNANCY RELATED CONDITION, ANTEPARTUM: ICD-10-CM

## 2020-11-25 DIAGNOSIS — O28.3 INCREASED NUCHAL TRANSLUCENCY SPACE ON FETAL ULTRASOUND: Primary | ICD-10-CM

## 2020-11-25 DIAGNOSIS — O28.3 ABNORMAL FETAL ULTRASOUND: Primary | ICD-10-CM

## 2020-11-25 PROCEDURE — 99N1100 PR STATISTIC VERIFI PRENATAL TRISOMY 21,18,13: Mod: 90 | Performed by: OBSTETRICS & GYNECOLOGY

## 2020-11-25 PROCEDURE — 76813 OB US NUCHAL MEAS 1 GEST: CPT | Mod: 26 | Performed by: OBSTETRICS & GYNECOLOGY

## 2020-11-25 PROCEDURE — 96040 HC GENETIC COUNSELING, EACH 30 MINUTES: CPT | Performed by: GENETIC COUNSELOR, MS

## 2020-11-25 PROCEDURE — 36415 COLL VENOUS BLD VENIPUNCTURE: CPT | Performed by: OBSTETRICS & GYNECOLOGY

## 2020-11-25 PROCEDURE — 76813 OB US NUCHAL MEAS 1 GEST: CPT

## 2020-11-25 PROCEDURE — 99000 SPECIMEN HANDLING OFFICE-LAB: CPT | Performed by: OBSTETRICS & GYNECOLOGY

## 2020-11-25 NOTE — PROGRESS NOTES
Please refer to ultrasound report under 'Imaging' Studies of 'Chart Review' tabs.    Nasim Sue M.D.

## 2020-12-01 ENCOUNTER — TELEPHONE (OUTPATIENT)
Dept: MATERNAL FETAL MEDICINE | Facility: CLINIC | Age: 25
End: 2020-12-01

## 2020-12-01 LAB — LAB SCANNED RESULT: NORMAL

## 2020-12-01 NOTE — TELEPHONE ENCOUNTER
Called and discussed normal NIPT results with Maryjane. Results indicate NO ANEUPLOIDY DETECTED for chromosomes 21, 18, 13, or sex chromosomes (XY).     This puts her current pregnancy at low risk for Down syndrome, trisomy 18, trisomy 13 and sex chromosome abnormalities. This test is reported to have the following sensitivities: Down syndrome- 99%, trisomy 18- 98%, and trisomy 13- 98%. Although these results are reassuring, this does not replace a standard chromosome analysis from a chorionic villus sampling or amniocentesis.     MSAFP is the appropriate second trimester screening test for open neural tube defects; the maternal quad screen is not recommended.     Her results are available in her Epic chart for her primary OB to review.    Plan: Dr. Sue recommended a 2/3 ultrasound be scheduled at 16 weeks. I informed Maryjane that our scheduling team would call her to set this appointment up.      Iza Sandoval MS, [facility]  Genetic Counselor  Maternal Fetal Medicine  [facility]   Phone: [phone]  Pager: [phone]  Email: [email or web address]

## 2020-12-14 ENCOUNTER — HEALTH MAINTENANCE LETTER (OUTPATIENT)
Age: 25
End: 2020-12-14

## 2020-12-18 ENCOUNTER — OFFICE VISIT (OUTPATIENT)
Dept: MATERNAL FETAL MEDICINE | Facility: CLINIC | Age: 25
End: 2020-12-18
Attending: OBSTETRICS & GYNECOLOGY
Payer: COMMERCIAL

## 2020-12-18 ENCOUNTER — HOSPITAL ENCOUNTER (OUTPATIENT)
Dept: ULTRASOUND IMAGING | Facility: CLINIC | Age: 25
End: 2020-12-18
Attending: OBSTETRICS & GYNECOLOGY
Payer: COMMERCIAL

## 2020-12-18 DIAGNOSIS — O35.BXX0 FETAL VENTRICULAR SEPTAL DEFECT AFFECTING ANTEPARTUM CARE OF MOTHER, SINGLE OR UNSPECIFIED FETUS: Primary | ICD-10-CM

## 2020-12-18 DIAGNOSIS — O28.3 INCREASED NUCHAL TRANSLUCENCY SPACE ON FETAL ULTRASOUND: ICD-10-CM

## 2020-12-18 PROCEDURE — 76805 OB US >/= 14 WKS SNGL FETUS: CPT | Mod: 26 | Performed by: OBSTETRICS & GYNECOLOGY

## 2020-12-18 PROCEDURE — 76805 OB US >/= 14 WKS SNGL FETUS: CPT

## 2021-02-02 ENCOUNTER — TRANSCRIBE ORDERS (OUTPATIENT)
Dept: MATERNAL FETAL MEDICINE | Facility: CLINIC | Age: 26
End: 2021-02-02

## 2021-02-02 DIAGNOSIS — O26.90 PREGNANCY RELATED CONDITION, ANTEPARTUM: Primary | ICD-10-CM

## 2021-03-03 ENCOUNTER — HOSPITAL ENCOUNTER (OUTPATIENT)
Dept: CARDIOLOGY | Facility: CLINIC | Age: 26
Discharge: HOME | End: 2021-03-03
Attending: OBSTETRICS & GYNECOLOGY | Admitting: OBSTETRICS & GYNECOLOGY
Payer: COMMERCIAL

## 2021-03-03 ENCOUNTER — HOSPITAL ENCOUNTER (OUTPATIENT)
Dept: ULTRASOUND IMAGING | Facility: CLINIC | Age: 26
Discharge: HOME | End: 2021-03-03
Attending: OBSTETRICS & GYNECOLOGY | Admitting: OBSTETRICS & GYNECOLOGY
Payer: COMMERCIAL

## 2021-03-03 ENCOUNTER — OFFICE VISIT (OUTPATIENT)
Dept: MATERNAL FETAL MEDICINE | Facility: CLINIC | Age: 26
End: 2021-03-03
Attending: OBSTETRICS & GYNECOLOGY
Payer: COMMERCIAL

## 2021-03-03 DIAGNOSIS — O26.90 PREGNANCY RELATED CONDITION, ANTEPARTUM: ICD-10-CM

## 2021-03-03 DIAGNOSIS — O35.9XX0 FETAL ABNORMALITY AFFECTING MANAGEMENT OF MOTHER, SINGLE OR UNSPECIFIED FETUS: Primary | ICD-10-CM

## 2021-03-03 PROCEDURE — 76811 OB US DETAILED SNGL FETUS: CPT

## 2021-03-03 PROCEDURE — 76825 ECHO EXAM OF FETAL HEART: CPT | Mod: 26 | Performed by: PEDIATRICS

## 2021-03-03 PROCEDURE — 76827 ECHO EXAM OF FETAL HEART: CPT

## 2021-03-03 PROCEDURE — 76811 OB US DETAILED SNGL FETUS: CPT | Mod: 26 | Performed by: OBSTETRICS & GYNECOLOGY

## 2021-03-03 PROCEDURE — 93325 DOPPLER ECHO COLOR FLOW MAPG: CPT | Mod: 26 | Performed by: PEDIATRICS

## 2021-03-03 PROCEDURE — 76827 ECHO EXAM OF FETAL HEART: CPT | Mod: 26 | Performed by: PEDIATRICS

## 2021-03-03 PROCEDURE — 93325 DOPPLER ECHO COLOR FLOW MAPG: CPT

## 2021-04-18 ENCOUNTER — HEALTH MAINTENANCE LETTER (OUTPATIENT)
Age: 26
End: 2021-04-18

## 2021-10-02 ENCOUNTER — HEALTH MAINTENANCE LETTER (OUTPATIENT)
Age: 26
End: 2021-10-02

## 2022-04-15 NOTE — TELEPHONE ENCOUNTER
Physician Results Form from [facility] was faxed back to [phone].   Dapsone Pregnancy And Lactation Text: This medication is Pregnancy Category C and is not considered safe during pregnancy or breast feeding.

## 2022-05-14 ENCOUNTER — HEALTH MAINTENANCE LETTER (OUTPATIENT)
Age: 27
End: 2022-05-14

## 2022-09-03 ENCOUNTER — HEALTH MAINTENANCE LETTER (OUTPATIENT)
Age: 27
End: 2022-09-03

## 2023-06-03 ENCOUNTER — HEALTH MAINTENANCE LETTER (OUTPATIENT)
Age: 28
End: 2023-06-03

## 2023-10-20 PROCEDURE — 88305 TISSUE EXAM BY PATHOLOGIST: CPT | Mod: TC,ORL | Performed by: PLASTIC SURGERY

## 2023-10-20 PROCEDURE — 88305 TISSUE EXAM BY PATHOLOGIST: CPT | Mod: 26 | Performed by: STUDENT IN AN ORGANIZED HEALTH CARE EDUCATION/TRAINING PROGRAM

## 2023-10-23 ENCOUNTER — LAB REQUISITION (OUTPATIENT)
Dept: LAB | Facility: CLINIC | Age: 28
End: 2023-10-23
Payer: COMMERCIAL

## 2023-10-24 LAB
PATH REPORT.COMMENTS IMP SPEC: NORMAL
PATH REPORT.COMMENTS IMP SPEC: NORMAL
PATH REPORT.FINAL DX SPEC: NORMAL
PATH REPORT.GROSS SPEC: NORMAL
PATH REPORT.MICROSCOPIC SPEC OTHER STN: NORMAL
PATH REPORT.RELEVANT HX SPEC: NORMAL
PHOTO IMAGE: NORMAL